# Patient Record
Sex: MALE | Race: WHITE | Employment: FULL TIME | URBAN - METROPOLITAN AREA
[De-identification: names, ages, dates, MRNs, and addresses within clinical notes are randomized per-mention and may not be internally consistent; named-entity substitution may affect disease eponyms.]

---

## 2017-02-07 NOTE — TELEPHONE ENCOUNTER
Approve/deny. Last OV was 7/21/16 and last refill was 11/5/15. Pt has an appt this Thursday with Coral Phillips

## 2017-02-07 NOTE — TELEPHONE ENCOUNTER
Please approve or Deny?     Patient was last seen 07/21/2016  Patient has a follow up with symone on 2/09/2017

## 2017-02-07 NOTE — TELEPHONE ENCOUNTER
Patient called to check on RX. He has been off med for 5 days. Did note that he hasn't been in the office for awhile so scheduled him this week with AV for med follow up Feb. 9th . Can we fill RX to hold him over until appointment.

## 2017-02-07 NOTE — TELEPHONE ENCOUNTER
Patient  Is out of medication lexapro. can have a few pills until his appointment on 2/09/2017 with Dontrell Snow. pharmacy said refill was refused. please call patient at [phone]

## 2017-02-09 ENCOUNTER — MED REC SCAN ONLY (OUTPATIENT)
Dept: FAMILY MEDICINE CLINIC | Facility: CLINIC | Age: 52
End: 2017-02-09

## 2017-02-09 PROBLEM — E55.9 VITAMIN D DEFICIENCY: Status: ACTIVE | Noted: 2017-02-09

## 2017-02-09 NOTE — PROGRESS NOTES
HPI:   Kenneth Whatley is a 49year old male who c/o needing refill on escitalopram 10 mg daily. Has been taking for 2 years. Denies side effects. Tolerating medication well. Not seeing a counselor. Has discussed weaning off lexapro with JULIANN.  States he cesar 1    Pt signed medical release for his lab results from his cardiologist's office. Will order anything additional as needed. We discussed the nature of depression/anxiety. Soheila Poser appears to be safe to His self and others.   I recommended treatment with esc

## 2017-04-24 ENCOUNTER — LAB ENCOUNTER (OUTPATIENT)
Dept: LAB | Age: 52
End: 2017-04-24
Attending: INTERNAL MEDICINE
Payer: COMMERCIAL

## 2017-04-24 ENCOUNTER — OFFICE VISIT (OUTPATIENT)
Dept: INTERNAL MEDICINE CLINIC | Facility: CLINIC | Age: 52
End: 2017-04-24

## 2017-04-24 VITALS
OXYGEN SATURATION: 99 % | WEIGHT: 273.5 LBS | DIASTOLIC BLOOD PRESSURE: 80 MMHG | BODY MASS INDEX: 37.04 KG/M2 | HEIGHT: 72 IN | SYSTOLIC BLOOD PRESSURE: 130 MMHG | TEMPERATURE: 98 F | HEART RATE: 64 BPM | RESPIRATION RATE: 16 BRPM

## 2017-04-24 DIAGNOSIS — K57.30 DIVERTICULOSIS LARGE INTESTINE W/O PERFORATION OR ABSCESS W/O BLEEDING: ICD-10-CM

## 2017-04-24 DIAGNOSIS — Z00.00 ROUTINE GENERAL MEDICAL EXAMINATION AT A HEALTH CARE FACILITY: Primary | ICD-10-CM

## 2017-04-24 DIAGNOSIS — I77.9 LEFT-SIDED CAROTID ARTERY DISEASE (HCC): ICD-10-CM

## 2017-04-24 DIAGNOSIS — Z86.010 HISTORY OF COLON POLYPS: ICD-10-CM

## 2017-04-24 DIAGNOSIS — E66.01 SEVERE OBESITY (BMI 35.0-39.9) WITH COMORBIDITY (HCC): ICD-10-CM

## 2017-04-24 DIAGNOSIS — Z00.00 ROUTINE GENERAL MEDICAL EXAMINATION AT A HEALTH CARE FACILITY: ICD-10-CM

## 2017-04-24 DIAGNOSIS — F41.1 GAD (GENERALIZED ANXIETY DISORDER): ICD-10-CM

## 2017-04-24 PROBLEM — Z86.0100 HISTORY OF COLON POLYPS: Status: ACTIVE | Noted: 2017-04-24

## 2017-04-24 PROBLEM — E55.9 VITAMIN D DEFICIENCY: Status: RESOLVED | Noted: 2017-02-09 | Resolved: 2017-04-24

## 2017-04-24 PROCEDURE — 81003 URINALYSIS AUTO W/O SCOPE: CPT | Performed by: INTERNAL MEDICINE

## 2017-04-24 PROCEDURE — 36415 COLL VENOUS BLD VENIPUNCTURE: CPT | Performed by: INTERNAL MEDICINE

## 2017-04-24 PROCEDURE — 83036 HEMOGLOBIN GLYCOSYLATED A1C: CPT | Performed by: INTERNAL MEDICINE

## 2017-04-24 PROCEDURE — 84153 ASSAY OF PSA TOTAL: CPT | Performed by: INTERNAL MEDICINE

## 2017-04-24 PROCEDURE — 99386 PREV VISIT NEW AGE 40-64: CPT | Performed by: INTERNAL MEDICINE

## 2017-04-24 PROCEDURE — 85025 COMPLETE CBC W/AUTO DIFF WBC: CPT | Performed by: INTERNAL MEDICINE

## 2017-04-24 RX ORDER — ECHINACEA PURPUREA EXTRACT 125 MG
TABLET ORAL
Refills: 11 | COMMUNITY
Start: 2017-04-11 | End: 2018-10-25

## 2017-04-24 NOTE — PROGRESS NOTES
Patient presents with:  Physical: Establish care       HPI: The pt is a 50 y/o WM, new patient, here to establish PCP and to undergo the male CPX. Due for wellness labs, although he had some labs done by his cardiologist (FLP and CMP) in late 11/2016.   Hi Immunization History  Administered            Date(s) Administered    TDAP                  07/01/2014        PE:  /80 mmHg  Pulse 64  Temp(Src) 97.8 °F (36.6 °C) (Oral)  Resp 16  Ht 72\"  Wt 273 lb 8 oz  BMI 37.09 kg/m2  SpO2 99%  Wt Readings Encounter  HGB A1C - CPX  PSA (Screening) - CPX  CBC W/DIFF - CPX  URINALYSIS, ROUTINE - CPX    Meds & Refills for this Visit:  No prescriptions requested or ordered in this encounter       Imaging & Consults:  None      Patient Care Team:  Rosa Zamorano MD

## 2017-09-06 DIAGNOSIS — F41.9 ANXIETY: ICD-10-CM

## 2017-09-06 NOTE — TELEPHONE ENCOUNTER
Requesting refill to be sent to [facility] in [address]. Please call pt to notify him once it has been sent.

## 2017-09-07 DIAGNOSIS — F41.9 ANXIETY: ICD-10-CM

## 2017-09-07 RX ORDER — ESCITALOPRAM OXALATE 10 MG/1
10 TABLET ORAL DAILY
Qty: 90 TABLET | Refills: 1 | Status: SHIPPED | OUTPATIENT
Start: 2017-09-07 | End: 2018-03-05

## 2017-09-07 RX ORDER — ESCITALOPRAM OXALATE 10 MG/1
10 TABLET ORAL DAILY
Qty: 90 TABLET | Refills: 1 | Status: CANCELLED | OUTPATIENT
Start: 2017-09-07

## 2017-11-14 ENCOUNTER — PRIOR ORIGINAL RECORDS (OUTPATIENT)
Dept: OTHER | Age: 52
End: 2017-11-14

## 2017-11-14 ENCOUNTER — MYAURORA ACCOUNT LINK (OUTPATIENT)
Dept: OTHER | Age: 52
End: 2017-11-14

## 2017-11-14 ENCOUNTER — HOSPITAL ENCOUNTER (OUTPATIENT)
Dept: CV DIAGNOSTICS | Age: 52
Discharge: HOME | End: 2017-11-14
Attending: INTERNAL MEDICINE
Payer: COMMERCIAL

## 2017-11-14 DIAGNOSIS — I65.23 BILATERAL CAROTID ARTERY STENOSIS: ICD-10-CM

## 2017-11-20 ENCOUNTER — TELEPHONE (OUTPATIENT)
Dept: INTERNAL MEDICINE CLINIC | Facility: CLINIC | Age: 52
End: 2017-11-20

## 2017-11-20 DIAGNOSIS — E04.1 SOLITARY NODULE OF RIGHT LOBE OF THYROID: Primary | ICD-10-CM

## 2017-11-20 NOTE — TELEPHONE ENCOUNTER
Patient's wife called and has question about process moving forward for Perry Dooley; he has nodule on thyroid he didn't know if he needed an appointment for Dr Chester Kimbrough could advise what step is next

## 2017-11-21 NOTE — TELEPHONE ENCOUNTER
Pt calling for results of carotid doppler which showed a thyroid nodule. Dr Miguel Ángel Kendrick office faxed results over to Dr Francoise Whitaker. Next step? Results on your desk.

## 2017-11-24 NOTE — TELEPHONE ENCOUNTER
Patient needs formal thyroid U/S. I've gone ahead and ordered up testing. He will also need formal thyroid blood test called a TSH, which has been ordered as well. Marcos Spaulding.  Rigoberto Fisher MD  Diplomate, American Board of Internal Medicine  [place] [address]

## 2017-11-28 ENCOUNTER — APPOINTMENT (OUTPATIENT)
Dept: LAB | Age: 52
End: 2017-11-28
Attending: INTERNAL MEDICINE
Payer: COMMERCIAL

## 2017-11-28 ENCOUNTER — HOSPITAL ENCOUNTER (OUTPATIENT)
Dept: ULTRASOUND IMAGING | Age: 52
Discharge: HOME | End: 2017-11-28
Attending: INTERNAL MEDICINE
Payer: COMMERCIAL

## 2017-11-28 DIAGNOSIS — E04.1 SOLITARY NODULE OF RIGHT LOBE OF THYROID: ICD-10-CM

## 2017-11-28 PROCEDURE — 76536 US EXAM OF HEAD AND NECK: CPT | Performed by: INTERNAL MEDICINE

## 2017-11-28 PROCEDURE — 84443 ASSAY THYROID STIM HORMONE: CPT

## 2017-11-28 PROCEDURE — 36415 COLL VENOUS BLD VENIPUNCTURE: CPT

## 2017-12-02 ENCOUNTER — PRIOR ORIGINAL RECORDS (OUTPATIENT)
Dept: OTHER | Age: 52
End: 2017-12-02

## 2017-12-05 ENCOUNTER — PRIOR ORIGINAL RECORDS (OUTPATIENT)
Dept: OTHER | Age: 52
End: 2017-12-05

## 2017-12-05 ENCOUNTER — MYAURORA ACCOUNT LINK (OUTPATIENT)
Dept: OTHER | Age: 52
End: 2017-12-05

## 2017-12-07 ENCOUNTER — MED REC SCAN ONLY (OUTPATIENT)
Dept: FAMILY MEDICINE CLINIC | Facility: CLINIC | Age: 52
End: 2017-12-07

## 2017-12-08 LAB
ALBUMIN: 4.6 G/DL
ALKALINE PHOSPHATATE(ALK PHOS): 55 IU/L
BILIRUBIN TOTAL: 0.7 MG/DL
BUN: 13 MG/DL
CALCIUM: 9.6 MG/DL
CHLORIDE: 105 MEQ/L
CHOLESTEROL, TOTAL: 169 MG/DL
CREATININE, SERUM: 0.91 MG/DL
GLOBULIN: 2.5 G/DL
GLUCOSE: 97 MG/DL
HDL CHOLESTEROL: 56 MG/DL
LDL CHOLESTEROL: 97 MG/DL
POTASSIUM, SERUM: 4.9 MEQ/L
PROTEIN, TOTAL: 7.1 G/DL
SGOT (AST): 17 IU/L
SGPT (ALT): 19 IU/L
SODIUM: 140 MEQ/L
TRIGLYCERIDES: 69 MG/DL

## 2018-01-18 DIAGNOSIS — F41.9 ANXIETY: ICD-10-CM

## 2018-01-18 RX ORDER — ESCITALOPRAM OXALATE 10 MG/1
TABLET ORAL
Qty: 90 TABLET | Refills: 0 | OUTPATIENT
Start: 2018-01-18

## 2018-02-09 ENCOUNTER — APPOINTMENT (OUTPATIENT)
Dept: GENERAL RADIOLOGY | Facility: HOSPITAL | Age: 53
End: 2018-02-09
Attending: EMERGENCY MEDICINE
Payer: COMMERCIAL

## 2018-02-09 ENCOUNTER — HOSPITAL ENCOUNTER (EMERGENCY)
Facility: HOSPITAL | Age: 53
Discharge: HOME | End: 2018-02-09
Attending: EMERGENCY MEDICINE
Payer: COMMERCIAL

## 2018-02-09 VITALS
WEIGHT: 265 LBS | BODY MASS INDEX: 35.89 KG/M2 | SYSTOLIC BLOOD PRESSURE: 129 MMHG | TEMPERATURE: 98 F | HEART RATE: 69 BPM | OXYGEN SATURATION: 97 % | HEIGHT: 72 IN | DIASTOLIC BLOOD PRESSURE: 90 MMHG | RESPIRATION RATE: 18 BRPM

## 2018-02-09 DIAGNOSIS — B34.9 VIRAL SYNDROME: Primary | ICD-10-CM

## 2018-02-09 PROCEDURE — 99282 EMERGENCY DEPT VISIT SF MDM: CPT

## 2018-02-09 NOTE — ED PROVIDER NOTES
Patient Seen in: [facility] Emergency Department    History   Patient presents with:  Cough/URI    Stated Complaint: Cold. HPI    Patient is a pleasant 81-year-old male, presenting for evaluation of congestion, general malaise, and sweats.   [employer] BMI 35.94 kg/m²         Physical Exam    Vital signs noted. GENERAL: Patient is awake and alert, resting comfortably on the cart, in no apparent distress. HEENT: Head is without evidence of trauma. Extraocular muscles are intact.  Pupils are equal and

## 2018-03-05 PROBLEM — E04.2 MULTINODULAR THYROID: Status: ACTIVE | Noted: 2018-03-05

## 2018-03-05 NOTE — PROGRESS NOTES
Patient presents with: Follow - Up: ELIOT - 6 months. HPI: The pt presents today for 6-month f/u ELIOT. Doing well. Stable on prescription medication. No medication SEs. Review of Systems   Constitutional: Negative. Neurological: Negative. of Internal Medicine  [facility]  [address] [facility],[address], [address], [facility], [address]  T: [phone]; F: [phone]     Meds & Refills for this Visit:  Signed Prescriptions Disp Refills    escitalopram 10 MG Oral Tab 90 tablet 1      Sig: Francisco Eller

## 2018-06-11 ENCOUNTER — OFFICE VISIT (OUTPATIENT)
Dept: INTERNAL MEDICINE CLINIC | Facility: CLINIC | Age: 53
End: 2018-06-11

## 2018-06-11 VITALS
OXYGEN SATURATION: 97 % | DIASTOLIC BLOOD PRESSURE: 90 MMHG | RESPIRATION RATE: 18 BRPM | WEIGHT: 280 LBS | BODY MASS INDEX: 38.77 KG/M2 | HEIGHT: 71.25 IN | SYSTOLIC BLOOD PRESSURE: 120 MMHG | TEMPERATURE: 98 F | HEART RATE: 71 BPM

## 2018-06-11 DIAGNOSIS — F41.1 GAD (GENERALIZED ANXIETY DISORDER): ICD-10-CM

## 2018-06-11 DIAGNOSIS — E66.01 SEVERE OBESITY (BMI 35.0-39.9) WITH COMORBIDITY (HCC): ICD-10-CM

## 2018-06-11 DIAGNOSIS — R21 PAPULAR RASH: Primary | ICD-10-CM

## 2018-06-11 DIAGNOSIS — L73.9 FOLLICULITIS: ICD-10-CM

## 2018-06-11 PROCEDURE — 99214 OFFICE O/P EST MOD 30 MIN: CPT | Performed by: INTERNAL MEDICINE

## 2018-06-11 RX ORDER — ESCITALOPRAM OXALATE 10 MG/1
10 TABLET ORAL DAILY
Qty: 90 TABLET | Refills: 1 | Status: SHIPPED | OUTPATIENT
Start: 2018-06-11 | End: 2018-10-25

## 2018-06-11 RX ORDER — ERYTHROMYCIN BASE IN ETHANOL 2 %
1 SWAB, MEDICATED TOPICAL 2 TIMES DAILY
Qty: 60 EACH | Refills: 3 | Status: SHIPPED | OUTPATIENT
Start: 2018-06-11 | End: 2018-06-20

## 2018-06-11 RX ORDER — TRIAMCINOLONE ACETONIDE 0.25 MG/G
1 CREAM TOPICAL 2 TIMES DAILY
Qty: 15 G | Refills: 1 | Status: SHIPPED | OUTPATIENT
Start: 2018-06-11 | End: 2018-10-25

## 2018-06-11 NOTE — PATIENT INSTRUCTIONS
- Use prescription steroid cream (triamcinolone) twice daily for next week  - If no improvement, follow up with Dermatology (Dr. Osmra Haskins).     - Ask your insurance if they cover Shingrix vaccine (for shingles) and if you can receive it in the doctor's offi

## 2018-06-11 NOTE — PROGRESS NOTES
Marek Edmond is a 47year old male. HPI:   Patient presents with:  Rash: on left side of abdomen and right arm  Patient presents with acute dermatological complaint.     He has been dealing with a rash on the left side of his abdomen and right side of mother; High Blood Pressure in his brother; Hypertension in his father; Kidney Disease in his father. Social:  reports that he has never smoked. He has never used smokeless tobacco. He reports that he drinks alcohol. He reports that he does not use drugs. getting Shingrix vaccination.       Patient Care Team:  Carrie Francisco MD as PCP - General (Internal Medicine)  Burt Huertas MD as Consulting Physician (Cardiovascular Diseases)  Adenike Rao MD as Consulting Physician ([facility])  Manish Navarroo

## 2018-06-20 ENCOUNTER — TELEPHONE (OUTPATIENT)
Dept: INTERNAL MEDICINE CLINIC | Facility: CLINIC | Age: 53
End: 2018-06-20

## 2018-06-20 RX ORDER — ERYTHROMYCIN 20 MG/G
1 GEL TOPICAL DAILY
Qty: 60 G | Refills: 1 | Status: SHIPPED | OUTPATIENT
Start: 2018-06-20 | End: 2019-06-03

## 2018-06-20 NOTE — TELEPHONE ENCOUNTER
Received form from pharmacy that Erythromycin Pads are unavailable. Please change to alternative gel/ointment/solution?

## 2018-10-03 RX ORDER — ESCITALOPRAM OXALATE 10 MG/1
10 TABLET ORAL DAILY
Qty: 90 TABLET | Refills: 1 | Status: CANCELLED | OUTPATIENT
Start: 2018-10-03

## 2018-10-03 NOTE — TELEPHONE ENCOUNTER
Escitalopram Oxalate 10 mg 90 day supply   [facility] [address] [address], [phone], [phone]

## 2018-10-04 NOTE — TELEPHONE ENCOUNTER
Refill requested:   Requested Prescriptions     Pending Prescriptions Disp Refills   • escitalopram 10 MG Oral Tab 90 tablet 1     Sig: Take 1 tablet (10 mg total) by mouth daily.        Failed protocol    Last refill: 6/11/18 #[address] [facility] in [place]

## 2018-10-25 ENCOUNTER — OFFICE VISIT (OUTPATIENT)
Dept: INTERNAL MEDICINE CLINIC | Facility: CLINIC | Age: 53
End: 2018-10-25
Payer: COMMERCIAL

## 2018-10-25 VITALS
BODY MASS INDEX: 38.63 KG/M2 | WEIGHT: 279 LBS | DIASTOLIC BLOOD PRESSURE: 78 MMHG | TEMPERATURE: 98 F | SYSTOLIC BLOOD PRESSURE: 134 MMHG | RESPIRATION RATE: 16 BRPM | HEART RATE: 68 BPM | HEIGHT: 71.25 IN

## 2018-10-25 DIAGNOSIS — E04.2 MULTINODULAR THYROID: ICD-10-CM

## 2018-10-25 DIAGNOSIS — Z00.00 ROUTINE GENERAL MEDICAL EXAMINATION AT A HEALTH CARE FACILITY: Primary | ICD-10-CM

## 2018-10-25 DIAGNOSIS — Z23 FLU VACCINE NEED: ICD-10-CM

## 2018-10-25 PROBLEM — E66.812 CLASS 2 OBESITY DUE TO EXCESS CALORIES WITHOUT SERIOUS COMORBIDITY WITH BODY MASS INDEX (BMI) OF 38.0 TO 38.9 IN ADULT: Status: ACTIVE | Noted: 2017-04-24

## 2018-10-25 PROBLEM — E66.09 CLASS 2 OBESITY DUE TO EXCESS CALORIES WITHOUT SERIOUS COMORBIDITY WITH BODY MASS INDEX (BMI) OF 38.0 TO 38.9 IN ADULT: Status: ACTIVE | Noted: 2017-04-24

## 2018-10-25 PROCEDURE — 90471 IMMUNIZATION ADMIN: CPT | Performed by: INTERNAL MEDICINE

## 2018-10-25 PROCEDURE — 90686 IIV4 VACC NO PRSV 0.5 ML IM: CPT | Performed by: INTERNAL MEDICINE

## 2018-10-25 PROCEDURE — 99396 PREV VISIT EST AGE 40-64: CPT | Performed by: INTERNAL MEDICINE

## 2018-10-25 RX ORDER — ESCITALOPRAM OXALATE 10 MG/1
10 TABLET ORAL DAILY
Qty: 90 TABLET | Refills: 3 | Status: SHIPPED | OUTPATIENT
Start: 2018-10-25 | End: 2019-11-04

## 2018-10-25 NOTE — PROGRESS NOTES
Patient presents with:  CPX: pt is not fasting      HPI: Hortensia Qian presents today for male CPX. Doing well. Due for wellness labs. Of note, he's also due for updated thyroid U/S and Flu shot.   Health goals still center around longevity, vitality, and qualit Cancer Mother         breast   • Kidney Disease Father         Dialysis   • Hypertension Father    • High Blood Pressure Brother          Immunization History  Administered            Date(s) Administered    TD                    09/27/2011      TDAP [18098]      Meds & Refills for this Visit:  Requested Prescriptions     Signed Prescriptions Disp Refills   • escitalopram 10 MG Oral Tab 90 tablet 3     Sig: Take 1 tablet (10 mg total) by mouth daily.        Imaging & Consults:  FLULAVAL INFLUENZA VACCIN

## 2018-11-03 ENCOUNTER — LAB ENCOUNTER (OUTPATIENT)
Dept: LAB | Age: 53
End: 2018-11-03
Attending: INTERNAL MEDICINE
Payer: COMMERCIAL

## 2018-11-03 ENCOUNTER — HOSPITAL ENCOUNTER (OUTPATIENT)
Dept: ULTRASOUND IMAGING | Age: 53
Discharge: HOME | End: 2018-11-03
Attending: INTERNAL MEDICINE
Payer: COMMERCIAL

## 2018-11-03 DIAGNOSIS — Z00.00 ROUTINE GENERAL MEDICAL EXAMINATION AT A HEALTH CARE FACILITY: ICD-10-CM

## 2018-11-03 DIAGNOSIS — E04.2 MULTINODULAR THYROID: ICD-10-CM

## 2018-11-03 PROCEDURE — 36415 COLL VENOUS BLD VENIPUNCTURE: CPT

## 2018-11-03 PROCEDURE — 84443 ASSAY THYROID STIM HORMONE: CPT

## 2018-11-03 PROCEDURE — 76536 US EXAM OF HEAD AND NECK: CPT | Performed by: INTERNAL MEDICINE

## 2018-11-03 PROCEDURE — 85025 COMPLETE CBC W/AUTO DIFF WBC: CPT

## 2018-11-03 PROCEDURE — 80061 LIPID PANEL: CPT

## 2018-11-03 PROCEDURE — 83036 HEMOGLOBIN GLYCOSYLATED A1C: CPT

## 2018-11-03 PROCEDURE — 81003 URINALYSIS AUTO W/O SCOPE: CPT

## 2018-11-03 PROCEDURE — 80053 COMPREHEN METABOLIC PANEL: CPT

## 2018-11-05 ENCOUNTER — PATIENT MESSAGE (OUTPATIENT)
Dept: INTERNAL MEDICINE CLINIC | Facility: CLINIC | Age: 53
End: 2018-11-05

## 2018-11-06 RX ORDER — SIMVASTATIN 10 MG
10 TABLET ORAL NIGHTLY
Qty: 90 TABLET | Refills: 3 | Status: SHIPPED | OUTPATIENT
Start: 2018-11-06 | End: 2019-06-10 | Stop reason: ALTCHOICE

## 2018-11-06 NOTE — TELEPHONE ENCOUNTER
From: Angeles Elder  To: Emmy Banks MD  Sent: 11/5/2018 6:45 PM CST  Subject: Prescription Question    [facility] Dr. Alber Quintana , would you be able to refill my simvastatin prescription? I was going to see Dr Ekaterina Valdez every 3 years to observe me. Let me know.  Than

## 2018-11-07 NOTE — PROGRESS NOTES
Script renewed. Courtney Finley. Gwendlyn Jeans, MD  Diplomate, American Board of Internal Medicine  [facility]  [address] [facility],[address], [address], [facility], [address] [place] [address]  T: [phone]; F: [address]

## 2018-11-12 ENCOUNTER — TELEPHONE (OUTPATIENT)
Dept: INTERNAL MEDICINE CLINIC | Facility: CLINIC | Age: 53
End: 2018-11-12

## 2018-11-12 NOTE — TELEPHONE ENCOUNTER
Patient requested thryoid US results be sent to ENT prior to next visit     Nicole Soto - [phone]  P - [phone]    Record release was signed at time of [address]

## 2019-01-03 ENCOUNTER — TELEPHONE (OUTPATIENT)
Dept: INTERNAL MEDICINE CLINIC | Facility: CLINIC | Age: 54
End: 2019-01-03

## 2019-01-03 ENCOUNTER — OFFICE VISIT (OUTPATIENT)
Dept: INTERNAL MEDICINE CLINIC | Facility: CLINIC | Age: 54
End: 2019-01-03
Payer: COMMERCIAL

## 2019-01-03 VITALS
WEIGHT: 280 LBS | HEART RATE: 64 BPM | DIASTOLIC BLOOD PRESSURE: 88 MMHG | HEIGHT: 71.25 IN | TEMPERATURE: 98 F | SYSTOLIC BLOOD PRESSURE: 114 MMHG | BODY MASS INDEX: 38.77 KG/M2

## 2019-01-03 DIAGNOSIS — J06.9 ACUTE URI: Primary | ICD-10-CM

## 2019-01-03 PROCEDURE — 99213 OFFICE O/P EST LOW 20 MIN: CPT | Performed by: INTERNAL MEDICINE

## 2019-01-03 RX ORDER — DEXTROMETHORPHAN HYDROBROMIDE AND PROMETHAZINE HYDROCHLORIDE 15; 6.25 MG/5ML; MG/5ML
5 SYRUP ORAL 4 TIMES DAILY PRN
Qty: 118 ML | Refills: 0 | Status: SHIPPED | OUTPATIENT
Start: 2019-01-03 | End: 2019-06-03

## 2019-01-03 RX ORDER — ERYTHROMYCIN BASE IN ETHANOL 2 %
SWAB, MEDICATED TOPICAL
Qty: 60 EACH | Refills: 0 | Status: SHIPPED | OUTPATIENT
Start: 2019-01-03 | End: 2019-06-03

## 2019-01-03 RX ORDER — AZITHROMYCIN 250 MG/1
TABLET, FILM COATED ORAL
Qty: 6 TABLET | Refills: 0 | Status: SHIPPED | OUTPATIENT
Start: 2019-01-03 | End: 2019-06-03

## 2019-01-03 NOTE — TELEPHONE ENCOUNTER
Patient has had URI and is prone to pneumonia would like to be seen for ongoing issues; requested nurse callback to triage further.  Offered walk in to wife (she called initially) or  kira

## 2019-01-03 NOTE — TELEPHONE ENCOUNTER
Pt stated to have symptoms for x2 weeks. Has tried otc cold medication w/o relief. Pt stated stronger cough at night time. Pt can come after 3pm [place].

## 2019-01-03 NOTE — TELEPHONE ENCOUNTER
OK to put him in with me at 4:45 PM today. Lydia Cox. Abel Patel MD  Diplomate, American Board of Internal Medicine  [facility]  [address] [place] [address],[address], [address], [facility], [address] [place] [address]  T: [phone]; F: [address]

## 2019-01-03 NOTE — PROGRESS NOTES
Patient presents with:  Cough: dry cough x 2 weeks  Headache  Nasal Congestion: runny nose, stuffy nose      HPI: Jose Shaw presents today for eval of 2 weeks of cough, nasal congestion, and headache. Has been feeling under the weather.   Possible sick contact of my ability. Crissy Rodriguez. Emelyn Hallman MD  Diplomate, American Board of Internal Medicine  [facility]  [address] [facility],[address], [address], [facility], [address]  T: [id number]; F: [address]     Meds & Refills for this Visit:  Requested Prescriptions

## 2019-01-07 ENCOUNTER — OFFICE VISIT (OUTPATIENT)
Dept: SURGERY | Facility: CLINIC | Age: 54
End: 2019-01-07
Payer: COMMERCIAL

## 2019-01-07 VITALS
WEIGHT: 280 LBS | BODY MASS INDEX: 38.77 KG/M2 | DIASTOLIC BLOOD PRESSURE: 80 MMHG | HEIGHT: 71.25 IN | SYSTOLIC BLOOD PRESSURE: 134 MMHG | TEMPERATURE: 98 F | HEART RATE: 70 BPM

## 2019-01-07 DIAGNOSIS — E66.09 CLASS 2 OBESITY DUE TO EXCESS CALORIES WITHOUT SERIOUS COMORBIDITY WITH BODY MASS INDEX (BMI) OF 38.0 TO 38.9 IN ADULT: ICD-10-CM

## 2019-01-07 DIAGNOSIS — R15.1 FECAL SMEARING: Primary | ICD-10-CM

## 2019-01-07 DIAGNOSIS — Z86.010 HISTORY OF COLON POLYPS: ICD-10-CM

## 2019-01-07 DIAGNOSIS — K57.30 DIVERTICULOSIS OF LARGE INTESTINE WITHOUT HEMORRHAGE: ICD-10-CM

## 2019-01-07 PROCEDURE — 99203 OFFICE O/P NEW LOW 30 MIN: CPT | Performed by: COLON & RECTAL SURGERY

## 2019-01-07 PROCEDURE — 46600 DIAGNOSTIC ANOSCOPY SPX: CPT | Performed by: COLON & RECTAL SURGERY

## 2019-01-07 NOTE — H&P
New Patient Visit Note       Active Problems      1. Fecal smearing    2. Diverticulosis of large intestine without hemorrhage    3. History of colon polyps    4.  Class 2 obesity due to excess calories without serious comorbidity with body mass index (BMI) [place], [facility]   • COLONOSCOPY,DIAGNOSTIC  7/23/16    cecal, 3 hepatic flexure, 2 descending polyps, diverticulosis   • VASECTOMY  2/26/10    Dr. Renny Palafox       The family history and social history have been reviewed by me today.     Family History   Problem R cough, shortness of breath and wheezing. Cardiovascular: Negative for chest pain, palpitations and leg swelling.    Gastrointestinal: Negative for abdominal distention, abdominal pain, anal bleeding, blood in stool, constipation, diarrhea, nausea and vom lesions. Digital rectal exam was performed. Patient had good resting anal sphincter tone. Patient had a long anal canal with a deep anus in relation to the buttocks. Anoscopy was performed.   There was mild enlargement of the internal hemorrhoids in

## 2019-02-17 NOTE — PATIENT INSTRUCTIONS
Assessment   Fecal smearing  (primary encounter diagnosis)  Diverticulosis of large intestine without hemorrhage  History of colon polyps  Class 2 obesity due to excess calories without serious comorbidity with body mass index (BMI) of 38.0 to 38.9 in adul

## 2019-02-28 VITALS
DIASTOLIC BLOOD PRESSURE: 90 MMHG | WEIGHT: 279 LBS | BODY MASS INDEX: 35.81 KG/M2 | HEIGHT: 74 IN | SYSTOLIC BLOOD PRESSURE: 138 MMHG | HEART RATE: 58 BPM

## 2019-06-07 ENCOUNTER — HOSPITAL ENCOUNTER (OUTPATIENT)
Dept: ULTRASOUND IMAGING | Facility: HOSPITAL | Age: 54
Discharge: HOME | End: 2019-06-07
Attending: INTERNAL MEDICINE
Payer: COMMERCIAL

## 2019-06-07 DIAGNOSIS — I77.9 CAROTID ARTERY DISEASE (HCC): ICD-10-CM

## 2019-06-07 PROCEDURE — 93880 EXTRACRANIAL BILAT STUDY: CPT | Performed by: INTERNAL MEDICINE

## 2019-06-10 NOTE — PROGRESS NOTES
Please inform patient. Carotid stenosis - less than 50% bilaterally - noted. Dr. Martin Hardy recommends: discontinue Simvastatin. Start Atorvastatin 10 mg daily. Start ASA 81 mg daily. Re check lipids / LFTs in 6 weeks. Return to see Dr. Martin Hardy in 3 months.  Nu Larson

## 2019-11-04 ENCOUNTER — LAB ENCOUNTER (OUTPATIENT)
Dept: LAB | Age: 54
End: 2019-11-04
Attending: INTERNAL MEDICINE
Payer: COMMERCIAL

## 2019-11-04 ENCOUNTER — OFFICE VISIT (OUTPATIENT)
Dept: INTERNAL MEDICINE CLINIC | Facility: CLINIC | Age: 54
End: 2019-11-04
Payer: COMMERCIAL

## 2019-11-04 VITALS
HEIGHT: 71.25 IN | WEIGHT: 288.5 LBS | HEART RATE: 64 BPM | TEMPERATURE: 98 F | DIASTOLIC BLOOD PRESSURE: 76 MMHG | SYSTOLIC BLOOD PRESSURE: 130 MMHG | BODY MASS INDEX: 39.94 KG/M2

## 2019-11-04 DIAGNOSIS — Z86.39 HISTORY OF VITAMIN D DEFICIENCY: ICD-10-CM

## 2019-11-04 DIAGNOSIS — E04.2 MULTINODULAR THYROID: ICD-10-CM

## 2019-11-04 DIAGNOSIS — Z12.5 SCREENING PSA (PROSTATE SPECIFIC ANTIGEN): ICD-10-CM

## 2019-11-04 DIAGNOSIS — E66.01 CLASS 2 SEVERE OBESITY DUE TO EXCESS CALORIES WITH SERIOUS COMORBIDITY AND BODY MASS INDEX (BMI) OF 39.0 TO 39.9 IN ADULT (HCC): Primary | ICD-10-CM

## 2019-11-04 DIAGNOSIS — Z00.00 ROUTINE GENERAL MEDICAL EXAMINATION AT A HEALTH CARE FACILITY: ICD-10-CM

## 2019-11-04 DIAGNOSIS — Z23 FLU VACCINE NEED: ICD-10-CM

## 2019-11-04 PROCEDURE — 85025 COMPLETE CBC W/AUTO DIFF WBC: CPT | Performed by: INTERNAL MEDICINE

## 2019-11-04 PROCEDURE — 99396 PREV VISIT EST AGE 40-64: CPT | Performed by: INTERNAL MEDICINE

## 2019-11-04 PROCEDURE — 90471 IMMUNIZATION ADMIN: CPT | Performed by: INTERNAL MEDICINE

## 2019-11-04 PROCEDURE — 83036 HEMOGLOBIN GLYCOSYLATED A1C: CPT | Performed by: INTERNAL MEDICINE

## 2019-11-04 PROCEDURE — 90686 IIV4 VACC NO PRSV 0.5 ML IM: CPT | Performed by: INTERNAL MEDICINE

## 2019-11-04 PROCEDURE — 84443 ASSAY THYROID STIM HORMONE: CPT | Performed by: INTERNAL MEDICINE

## 2019-11-04 PROCEDURE — 84153 ASSAY OF PSA TOTAL: CPT | Performed by: INTERNAL MEDICINE

## 2019-11-04 PROCEDURE — 36415 COLL VENOUS BLD VENIPUNCTURE: CPT | Performed by: INTERNAL MEDICINE

## 2019-11-04 PROCEDURE — 82306 VITAMIN D 25 HYDROXY: CPT | Performed by: INTERNAL MEDICINE

## 2019-11-04 RX ORDER — ESCITALOPRAM OXALATE 10 MG/1
10 TABLET ORAL DAILY
Qty: 90 TABLET | Refills: 3 | Status: SHIPPED | OUTPATIENT
Start: 2019-11-04 | End: 2020-10-19

## 2019-11-04 RX ORDER — ASPIRIN 81 MG/1
TABLET, COATED ORAL
Refills: 11 | COMMUNITY
Start: 2019-10-08 | End: 2019-11-04

## 2019-11-04 NOTE — PROGRESS NOTES
Patient presents with:  CPX      HPI: Adrien Cota presents today for male CPX. Stable health. Due for select wellness labs. Health goals center around longevity, vitality, and QOL. Review of Systems   All other systems reviewed and are negative.     Patient 07/01/2014 03/11/2019        PE:  /76 (BP Location: Left arm, Patient Position: Sitting, Cuff Size: adult)   Pulse 64   Temp 98.1 °F (36.7 °C) (Oral)   Ht 71.25\"   Wt 288 lb 8 oz (130.9 kg)   BMI 39.96 kg/m²   Wt Readings from Last 6 Encounters:  1 Check wellness labs. 2. HM/Prev - Check PSA. Flu shot given. 3. Multinodular thyroid - Obtain updated U/S. If still stable then I would consider stopping doing these annual checks as he would've had 3 years in a row of stability.   4. Hx of Vitamin D de

## 2019-11-07 RX ORDER — ESCITALOPRAM OXALATE 10 MG/1
TABLET ORAL
Qty: 90 TABLET | Refills: 0 | OUTPATIENT
Start: 2019-11-07

## 2019-11-14 ENCOUNTER — HOSPITAL ENCOUNTER (OUTPATIENT)
Dept: ULTRASOUND IMAGING | Age: 54
Discharge: HOME | End: 2019-11-14
Attending: INTERNAL MEDICINE
Payer: COMMERCIAL

## 2019-11-14 DIAGNOSIS — E04.2 MULTINODULAR THYROID: ICD-10-CM

## 2019-11-14 PROCEDURE — 76536 US EXAM OF HEAD AND NECK: CPT | Performed by: INTERNAL MEDICINE

## 2019-12-05 ENCOUNTER — PATIENT MESSAGE (OUTPATIENT)
Dept: INTERNAL MEDICINE CLINIC | Facility: CLINIC | Age: 54
End: 2019-12-05

## 2019-12-06 ENCOUNTER — APPOINTMENT (OUTPATIENT)
Dept: LAB | Age: 54
End: 2019-12-06
Attending: NURSE PRACTITIONER
Payer: COMMERCIAL

## 2019-12-06 ENCOUNTER — OFFICE VISIT (OUTPATIENT)
Dept: INTERNAL MEDICINE CLINIC | Facility: CLINIC | Age: 54
End: 2019-12-06
Payer: COMMERCIAL

## 2019-12-06 VITALS
SYSTOLIC BLOOD PRESSURE: 120 MMHG | WEIGHT: 293 LBS | BODY MASS INDEX: 40.57 KG/M2 | RESPIRATION RATE: 16 BRPM | DIASTOLIC BLOOD PRESSURE: 80 MMHG | TEMPERATURE: 98 F | HEIGHT: 71.25 IN | HEART RATE: 72 BPM

## 2019-12-06 DIAGNOSIS — R25.2 MUSCLE CRAMPING: Primary | ICD-10-CM

## 2019-12-06 DIAGNOSIS — R25.2 MUSCLE CRAMPING: ICD-10-CM

## 2019-12-06 DIAGNOSIS — M54.31 SCIATICA OF RIGHT SIDE: ICD-10-CM

## 2019-12-06 PROCEDURE — 99213 OFFICE O/P EST LOW 20 MIN: CPT | Performed by: NURSE PRACTITIONER

## 2019-12-06 PROCEDURE — 83735 ASSAY OF MAGNESIUM: CPT | Performed by: NURSE PRACTITIONER

## 2019-12-06 PROCEDURE — 83540 ASSAY OF IRON: CPT | Performed by: NURSE PRACTITIONER

## 2019-12-06 PROCEDURE — 36415 COLL VENOUS BLD VENIPUNCTURE: CPT | Performed by: NURSE PRACTITIONER

## 2019-12-06 RX ORDER — CYCLOBENZAPRINE HCL 10 MG
10 TABLET ORAL NIGHTLY
Qty: 10 TABLET | Refills: 0 | Status: SHIPPED | OUTPATIENT
Start: 2019-12-06 | End: 2019-12-16

## 2019-12-06 NOTE — PATIENT INSTRUCTIONS
Hamstring Curls    The following exercise helps build strong, balanced leg muscles. Make sure to adjust exercise machines as instructed by your physical therapist. He or she will tell you how many times to do the exercise.   Note: To prevent injury, leslie © 5179-1791 [employer] [address]. [address] [facility], [facility] [place]. All rights reserved. This information is not intended as a substitute for professional medical care. Always follow your healthcare professional's instructions.         Marium

## 2019-12-06 NOTE — PROGRESS NOTES
CHIEF COMPLAINT:     Patient presents with:  Leg Pain: Pt c/o right leg pain. States it is a throbbing pain. 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  Has taken Aleve - 3 tablets q 8 hours x 5 days with no c apparent distress  SKIN: no rashes, no suspicious lesions  LUNGS: clear to auscultation bilaterally, no wheezes or rhonchi. Breathing is non labored. CARDIO: RRR without murmur  EXTREMITIES: no cyanosis, clubbing or edema.  FROM of right hip and knee witho

## 2019-12-09 ENCOUNTER — PATIENT MESSAGE (OUTPATIENT)
Dept: INTERNAL MEDICINE CLINIC | Facility: CLINIC | Age: 54
End: 2019-12-09

## 2019-12-09 NOTE — TELEPHONE ENCOUNTER
Pt asking if Dr Carloz Burgess will order MRI right leg. Pt seen on 12/06 and despite cyclobenzaprine leg pain worse after doing exercises this weekend. Rates pain at 8 with weight bearing. Standing pt states he is ok.   Pain starts at right thigh and travels to s

## 2019-12-09 NOTE — TELEPHONE ENCOUNTER
From: Bettyjo Lesches  To: Purnima Le MD  Sent: 12/9/2019 7:54 AM CST  Subject: Non-Urgent Medical Question    Good Morning ,    I was seen Friday for my leg. She recommended exercises and muscle relaxer at night.  She said if it doesn’t improve she would

## 2019-12-09 NOTE — TELEPHONE ENCOUNTER
Patient calling for nurse callback to discuss MRI for leg pain; KR not in office today, please call to triage further per patient request

## 2019-12-10 ENCOUNTER — TELEPHONE (OUTPATIENT)
Dept: ORTHOPEDICS CLINIC | Facility: CLINIC | Age: 54
End: 2019-12-10

## 2019-12-10 DIAGNOSIS — M25.561 RIGHT KNEE PAIN, UNSPECIFIED CHRONICITY: Primary | ICD-10-CM

## 2019-12-10 DIAGNOSIS — M25.551 RIGHT HIP PAIN: Primary | ICD-10-CM

## 2019-12-10 DIAGNOSIS — M79.661 PAIN OF RIGHT LOWER LEG: ICD-10-CM

## 2019-12-10 NOTE — TELEPHONE ENCOUNTER
LMOM and notified patient via mychart that orders were placed in system prior to appointment     Future Appointments   Date Time Provider Willis Guillen   12/13/2019  8:00 AM Art Freeman MD [facility] [place]   12/19/2019  3:00 PM Gely Lofton MD E

## 2019-12-10 NOTE — TELEPHONE ENCOUNTER
Patient is being referred for right thigh/hip pain, having issues with weight bearing.  Please add orders to system so patient can have them done prior to appointment     Future Appointments   Date Time Provider Willis Guillen   12/13/2019  8:00 AM Jaen Boudreaux

## 2019-12-10 NOTE — TELEPHONE ENCOUNTER
Please replace order. Currently for hip and pelvis. Patient phoned saying that his issue is the right hamstring, knee and shin (calf). He has cancelled his appointment with xray and is awaiting confirmation of order for his complaint.     Please advise

## 2019-12-11 ENCOUNTER — HOSPITAL ENCOUNTER (OUTPATIENT)
Dept: GENERAL RADIOLOGY | Age: 54
Discharge: HOME | End: 2019-12-11
Attending: ORTHOPAEDIC SURGERY
Payer: COMMERCIAL

## 2019-12-11 DIAGNOSIS — M79.661 PAIN OF RIGHT LOWER LEG: ICD-10-CM

## 2019-12-11 DIAGNOSIS — M25.551 RIGHT HIP PAIN: ICD-10-CM

## 2019-12-11 DIAGNOSIS — M25.561 RIGHT KNEE PAIN, UNSPECIFIED CHRONICITY: ICD-10-CM

## 2019-12-11 PROCEDURE — 73590 X-RAY EXAM OF LOWER LEG: CPT | Performed by: ORTHOPAEDIC SURGERY

## 2019-12-11 PROCEDURE — 73502 X-RAY EXAM HIP UNI 2-3 VIEWS: CPT | Performed by: ORTHOPAEDIC SURGERY

## 2019-12-11 PROCEDURE — 73564 X-RAY EXAM KNEE 4 OR MORE: CPT | Performed by: ORTHOPAEDIC SURGERY

## 2019-12-13 ENCOUNTER — OFFICE VISIT (OUTPATIENT)
Dept: ORTHOPEDICS CLINIC | Facility: CLINIC | Age: 54
End: 2019-12-13
Payer: COMMERCIAL

## 2019-12-13 ENCOUNTER — TELEPHONE (OUTPATIENT)
Dept: ORTHOPEDICS CLINIC | Facility: CLINIC | Age: 54
End: 2019-12-13

## 2019-12-13 VITALS
RESPIRATION RATE: 16 BRPM | OXYGEN SATURATION: 97 % | WEIGHT: 293 LBS | HEART RATE: 70 BPM | BODY MASS INDEX: 40.57 KG/M2 | HEIGHT: 71.25 IN

## 2019-12-13 DIAGNOSIS — M79.604 PAIN OF RIGHT LOWER EXTREMITY: Primary | ICD-10-CM

## 2019-12-13 PROCEDURE — 99203 OFFICE O/P NEW LOW 30 MIN: CPT | Performed by: ORTHOPAEDIC SURGERY

## 2019-12-13 RX ORDER — CYCLOBENZAPRINE HCL 10 MG
10 TABLET ORAL NIGHTLY
Qty: 10 TABLET | Refills: 0 | OUTPATIENT
Start: 2019-12-13 | End: 2019-12-23

## 2019-12-13 NOTE — TELEPHONE ENCOUNTER
Disregard request    Patient picked up 10 day supply on 12/6/2019    Was instructed to only take for 10 days

## 2019-12-13 NOTE — H&P
[facility] New Patient Note    CC: Patient presents with:  Consult: right leg pain started 3 weeks ago leg spasms and achy feeling. Patient was given muscle relaxer and home exercises from PCP.  Monday he states he could not walk and his knee was swol Refill   • Cyclobenzaprine HCl 10 MG Oral Tab Take 1 tablet (10 mg total) by mouth nightly for 10 days. 10 tablet 0   • escitalopram 10 MG Oral Tab Take 1 tablet (10 mg total) by mouth daily.  90 tablet 3   • atorvastatin 20 MG Oral Tab Take 1 tablet (20 mg sagittal plane  • Neuromuscular: 5 out of 5 quadriceps strength, 5 out of 5 EHL/tibialis anterior and gastrocsoleus.   Sensation intact light touch in SP, DP and tibial nerve distributions  • Vascular: Warm and well-perfused lower extremity      Imaging: X-

## 2019-12-13 NOTE — TELEPHONE ENCOUNTER
S/w patient regarding medication refills. Per Dr. Dominga Miranda patient should call PCP for refill on Flexeril and informed patient that Aleve is the same as Naproxen that a script is not needed. Patient verbalized understanding.  MJ

## 2019-12-13 NOTE — TELEPHONE ENCOUNTER
Patient is requesting a refill on his flexeril that his PCP gave him and he would like a script for naproxen

## 2019-12-17 ENCOUNTER — HOSPITAL ENCOUNTER (OUTPATIENT)
Dept: MRI IMAGING | Facility: HOSPITAL | Age: 54
Discharge: HOME | End: 2019-12-17
Attending: ORTHOPAEDIC SURGERY
Payer: COMMERCIAL

## 2019-12-17 DIAGNOSIS — M79.604 PAIN OF RIGHT LOWER EXTREMITY: ICD-10-CM

## 2019-12-17 PROCEDURE — A9575 INJ GADOTERATE MEGLUMI 0.1ML: HCPCS | Performed by: ORTHOPAEDIC SURGERY

## 2019-12-17 PROCEDURE — 73720 MRI LWR EXTREMITY W/O&W/DYE: CPT | Performed by: ORTHOPAEDIC SURGERY

## 2019-12-19 ENCOUNTER — TELEPHONE (OUTPATIENT)
Dept: ORTHOPEDICS CLINIC | Facility: CLINIC | Age: 54
End: 2019-12-19

## 2019-12-19 ENCOUNTER — OFFICE VISIT (OUTPATIENT)
Dept: ELECTROPHYSIOLOGY | Facility: HOSPITAL | Age: 54
End: 2019-12-19
Attending: INTERNAL MEDICINE
Payer: COMMERCIAL

## 2019-12-19 DIAGNOSIS — R20.2 PARESTHESIA OF BOTH HANDS: ICD-10-CM

## 2019-12-19 DIAGNOSIS — R20.0 NUMBNESS IN BOTH HANDS: ICD-10-CM

## 2019-12-19 DIAGNOSIS — R20.2 TINGLING IN EXTREMITIES: Primary | ICD-10-CM

## 2019-12-19 DIAGNOSIS — M79.642 BILATERAL HAND PAIN: ICD-10-CM

## 2019-12-19 DIAGNOSIS — M79.641 BILATERAL HAND PAIN: ICD-10-CM

## 2019-12-19 NOTE — TELEPHONE ENCOUNTER
Patient is calling for MRI results. Can I relay the results to the patient? PROCEDURE:  MRI LOWER LEG (W+WO), RIGHT (CPT=73720)   INDICATIONS:  M79.604 Pain in right leg. Right proximal lower leg pain.     TECHNIQUE:    A variety of imaging planes and

## 2019-12-20 ENCOUNTER — TELEPHONE (OUTPATIENT)
Dept: ORTHOPEDICS CLINIC | Facility: CLINIC | Age: 54
End: 2019-12-20

## 2019-12-20 DIAGNOSIS — S83.91XA SPRAIN OF RIGHT KNEE, UNSPECIFIED LIGAMENT, INITIAL ENCOUNTER: Primary | ICD-10-CM

## 2019-12-20 PROBLEM — R20.2 TINGLING IN EXTREMITIES: Status: ACTIVE | Noted: 2019-12-20

## 2019-12-20 NOTE — PROCEDURES
[facility], [address] [place] [address]      PATIENT'S NAME: Cody Smith   REFERRING PHYSICIAN: Maco Sanabria M.D.    PATIENT ACCOUNT #: [de-identified] LOCATION: [facility]   MEDICAL RECORD #: [id number] DATE OF BIRTH: [birth year]

## 2019-12-20 NOTE — TELEPHONE ENCOUNTER
S/w patient regarding future care. Per Dr. Dominga Miranda patient is to continue taking Anti-inflammatory. Patient will start PT would like external script placed for [address] sent on 12.20. 19.  MJ

## 2019-12-20 NOTE — TELEPHONE ENCOUNTER
S/w patient regarding MRI results and conveyed results were normal. Patient continued to ask why the continued pain in the lower leg. Patient states he is still unable to bear weight.  I informed patient I will take his concerns and speak with Dr. Radha Villanueva

## 2019-12-26 PROBLEM — M17.11 PRIMARY OSTEOARTHRITIS OF RIGHT KNEE: Status: ACTIVE | Noted: 2019-12-26

## 2020-02-07 ENCOUNTER — PATIENT MESSAGE (OUTPATIENT)
Dept: INTERNAL MEDICINE CLINIC | Facility: CLINIC | Age: 55
End: 2020-02-07

## 2020-02-07 ENCOUNTER — TELEPHONE (OUTPATIENT)
Dept: INTERNAL MEDICINE CLINIC | Facility: CLINIC | Age: 55
End: 2020-02-07

## 2020-02-07 NOTE — TELEPHONE ENCOUNTER
From: Sun Guillaume  To:  Toña Khan MD  Sent: 2/7/2020 11:33 AM CST  Subject: Other    Hello,    I noticed on my November 4th labs my insurance didn’t cover two test.   I know we check my CBC for my levels as I have high cholesterol that’s why I’m on me

## 2020-02-12 NOTE — TELEPHONE ENCOUNTER
addended the DOS 11/04/2019 and associated an E66.09 dx code to the CBC and Hgb A1C orders.- Request sent to billing to resubmit with corrected diagnosis

## 2020-02-21 NOTE — PROGRESS NOTES
ADDENDUM:    In the assessment/plan section, I ordered the tests TSH w/ reflex free T4 and A1c. The reason for the TSH testing is for his diagnosis of multinodular thyroid. His reason for the A1c is for the diagnosis of Class 2 obesity. Casper Muñoz.  Tyrone Rivera,

## 2020-07-02 ENCOUNTER — PATIENT MESSAGE (OUTPATIENT)
Dept: INTERNAL MEDICINE CLINIC | Facility: CLINIC | Age: 55
End: 2020-07-02

## 2020-07-02 RX ORDER — ERYTHROMYCIN BASE IN ETHANOL 2 %
SWAB, MEDICATED TOPICAL
Qty: 60 EACH | Refills: 0 | Status: SHIPPED | OUTPATIENT
Start: 2020-07-02 | End: 2021-03-23

## 2020-07-02 NOTE — TELEPHONE ENCOUNTER
From: Anderson Hernandez  To: Sindi Antonio MD  Sent: 7/2/2020 8:39 AM CDT  Subject: Prescription Question    Hello,    I didn’t see it on my list but you prescribed Aminata 2% pads for me before to help. With the hot weather and my job can I get a refill?      I’m

## 2020-08-14 ENCOUNTER — TELEPHONE (OUTPATIENT)
Dept: INTERNAL MEDICINE CLINIC | Facility: CLINIC | Age: 55
End: 2020-08-14

## 2020-08-14 NOTE — TELEPHONE ENCOUNTER
Pt would like some advise from nurse says that at work he was around a coworker who tested positive for COVID

## 2020-08-14 NOTE — TELEPHONE ENCOUNTER
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 Pt last contact w/ person was on Wednesday. Pt advised he was within 2 feet of person but they were both wearing mask/face covering at all times.  Pt denies any symp

## 2020-08-31 ENCOUNTER — APPOINTMENT (OUTPATIENT)
Dept: GENERAL RADIOLOGY | Age: 55
End: 2020-08-31
Payer: COMMERCIAL

## 2020-08-31 ENCOUNTER — HOSPITAL ENCOUNTER (EMERGENCY)
Age: 55
Discharge: HOME | End: 2020-08-31
Attending: EMERGENCY MEDICINE
Payer: COMMERCIAL

## 2020-08-31 VITALS
TEMPERATURE: 98 F | BODY MASS INDEX: 37.25 KG/M2 | WEIGHT: 275 LBS | SYSTOLIC BLOOD PRESSURE: 129 MMHG | RESPIRATION RATE: 18 BRPM | DIASTOLIC BLOOD PRESSURE: 74 MMHG | HEIGHT: 72 IN | HEART RATE: 64 BPM | OXYGEN SATURATION: 98 %

## 2020-08-31 DIAGNOSIS — S93.402A MODERATE LEFT ANKLE SPRAIN, INITIAL ENCOUNTER: Primary | ICD-10-CM

## 2020-08-31 PROCEDURE — 99283 EMERGENCY DEPT VISIT LOW MDM: CPT

## 2020-08-31 PROCEDURE — 73610 X-RAY EXAM OF ANKLE: CPT | Performed by: EMERGENCY MEDICINE

## 2020-08-31 PROCEDURE — 73630 X-RAY EXAM OF FOOT: CPT | Performed by: EMERGENCY MEDICINE

## 2020-08-31 NOTE — ED PROVIDER NOTES
Patient Seen in: [address] In [place]      History   Patient presents with:  Lower Extremity Injury    Stated Complaint: ANKLE INJ    HPI    56-year-old male complaint of left ankle pain and foot pain the patient rolled this yesterda Course   Labs Reviewed - No data to display       Xr Ankle (min 3 Views), Left (cpt=73610)    Result Date: 8/31/2020  CONCLUSION:    No acute fracture, or dislocation. Mild ankle DJD. Mild anterior tissue swelling may reflect contusion.   Plantar calcanea

## 2020-10-14 ENCOUNTER — HOSPITAL ENCOUNTER (OUTPATIENT)
Dept: CT IMAGING | Age: 55
Discharge: HOME | End: 2020-10-14
Attending: INTERNAL MEDICINE

## 2020-10-14 DIAGNOSIS — Z13.9 ENCOUNTER FOR SCREENING: ICD-10-CM

## 2020-10-19 ENCOUNTER — OFFICE VISIT (OUTPATIENT)
Dept: INTERNAL MEDICINE CLINIC | Facility: CLINIC | Age: 55
End: 2020-10-19
Payer: COMMERCIAL

## 2020-10-19 VITALS
HEART RATE: 64 BPM | TEMPERATURE: 98 F | BODY MASS INDEX: 39.87 KG/M2 | WEIGHT: 288 LBS | RESPIRATION RATE: 16 BRPM | HEIGHT: 71.25 IN | DIASTOLIC BLOOD PRESSURE: 92 MMHG | SYSTOLIC BLOOD PRESSURE: 128 MMHG

## 2020-10-19 DIAGNOSIS — Z23 FLU VACCINE NEED: ICD-10-CM

## 2020-10-19 DIAGNOSIS — E04.2 MULTINODULAR THYROID: ICD-10-CM

## 2020-10-19 DIAGNOSIS — Z12.5 SCREENING PSA (PROSTATE SPECIFIC ANTIGEN): ICD-10-CM

## 2020-10-19 DIAGNOSIS — E66.09 CLASS 2 OBESITY DUE TO EXCESS CALORIES WITHOUT SERIOUS COMORBIDITY WITH BODY MASS INDEX (BMI) OF 39.0 TO 39.9 IN ADULT: ICD-10-CM

## 2020-10-19 DIAGNOSIS — Z00.00 ROUTINE GENERAL MEDICAL EXAMINATION AT A HEALTH CARE FACILITY: Primary | ICD-10-CM

## 2020-10-19 PROCEDURE — 90471 IMMUNIZATION ADMIN: CPT | Performed by: INTERNAL MEDICINE

## 2020-10-19 PROCEDURE — 3080F DIAST BP >= 90 MM HG: CPT | Performed by: INTERNAL MEDICINE

## 2020-10-19 PROCEDURE — 3074F SYST BP LT 130 MM HG: CPT | Performed by: INTERNAL MEDICINE

## 2020-10-19 PROCEDURE — 90686 IIV4 VACC NO PRSV 0.5 ML IM: CPT | Performed by: INTERNAL MEDICINE

## 2020-10-19 PROCEDURE — 3008F BODY MASS INDEX DOCD: CPT | Performed by: INTERNAL MEDICINE

## 2020-10-19 PROCEDURE — 99396 PREV VISIT EST AGE 40-64: CPT | Performed by: INTERNAL MEDICINE

## 2020-10-19 RX ORDER — ESCITALOPRAM OXALATE 10 MG/1
10 TABLET ORAL DAILY
Qty: 90 TABLET | Refills: 3 | Status: SHIPPED | OUTPATIENT
Start: 2020-10-19 | End: 2020-11-23

## 2020-10-19 NOTE — PROGRESS NOTES
Patient presents with:  CPX      HPI: Iftikhar Escamilla presents for annual CPX. Stable health. Due for wellness labs. Health goals center around longevity, vitality, and QOL. Review of Systems   All other systems reviewed and are negative.       Patient Active Pro ml Prefilled syringe (40946)                          10/25/2018  11/04/2019      TD                    09/27/2011      TDAP                  07/01/2014 03/11/2019        PE:  BP (!) 128/92 (BP Location: Left arm, Patient Position: Sitting, Cuff Size: thais remains the same regardless of choice. He verbally agrees to and understands the plan as outlined above. He was also afforded the time and opportunity to ask questions, which were then answered to the best of my ability. Marcos Spaulding.  Rigoberto Fisher MD  Diplomate, A

## 2020-10-19 NOTE — PATIENT INSTRUCTIONS
Dear Shantal Holland,    Thank you for expressing interest in learning more about my new medical practice.  Your choice to be a member of my [facility]-affiliated practice, in partnership with Linda Arguelles, is an investment in your health and well-being that can He's in [place].

## 2020-10-22 ENCOUNTER — LABORATORY ENCOUNTER (OUTPATIENT)
Dept: LAB | Age: 55
End: 2020-10-22
Attending: INTERNAL MEDICINE
Payer: COMMERCIAL

## 2020-10-22 DIAGNOSIS — I77.9 BILATERAL CAROTID ARTERY DISEASE, UNSPECIFIED TYPE (HCC): ICD-10-CM

## 2020-10-22 DIAGNOSIS — E04.2 MULTINODULAR THYROID: ICD-10-CM

## 2020-10-22 DIAGNOSIS — E78.00 PURE HYPERCHOLESTEROLEMIA: ICD-10-CM

## 2020-10-22 DIAGNOSIS — R03.0 ELEVATED BLOOD PRESSURE READING: ICD-10-CM

## 2020-10-22 DIAGNOSIS — Z12.5 SCREENING PSA (PROSTATE SPECIFIC ANTIGEN): ICD-10-CM

## 2020-10-22 DIAGNOSIS — Z00.00 ROUTINE GENERAL MEDICAL EXAMINATION AT A HEALTH CARE FACILITY: ICD-10-CM

## 2020-10-22 PROCEDURE — 84153 ASSAY OF PSA TOTAL: CPT | Performed by: INTERNAL MEDICINE

## 2020-10-22 PROCEDURE — 84443 ASSAY THYROID STIM HORMONE: CPT | Performed by: INTERNAL MEDICINE

## 2020-10-22 PROCEDURE — 81003 URINALYSIS AUTO W/O SCOPE: CPT | Performed by: INTERNAL MEDICINE

## 2020-10-22 PROCEDURE — 85025 COMPLETE CBC W/AUTO DIFF WBC: CPT | Performed by: INTERNAL MEDICINE

## 2020-10-22 PROCEDURE — 36415 COLL VENOUS BLD VENIPUNCTURE: CPT | Performed by: INTERNAL MEDICINE

## 2020-10-22 PROCEDURE — 83036 HEMOGLOBIN GLYCOSYLATED A1C: CPT | Performed by: INTERNAL MEDICINE

## 2020-10-27 ENCOUNTER — HOSPITAL ENCOUNTER (OUTPATIENT)
Dept: ULTRASOUND IMAGING | Age: 55
Discharge: HOME | End: 2020-10-27
Attending: OTOLARYNGOLOGY
Payer: COMMERCIAL

## 2020-10-27 DIAGNOSIS — E04.1 RIGHT THYROID NODULE: ICD-10-CM

## 2020-10-27 PROCEDURE — 76536 US EXAM OF HEAD AND NECK: CPT | Performed by: OTOLARYNGOLOGY

## 2020-11-12 ENCOUNTER — TELEPHONE (OUTPATIENT)
Dept: INTERNAL MEDICINE CLINIC | Facility: CLINIC | Age: 55
End: 2020-11-12

## 2020-11-12 NOTE — TELEPHONE ENCOUNTER
Patient states he was told by Dr. Rockne Najjar if thyroid US stable he may not need to continue yearly US follow up. Result from 10/27/20:  =====  CONCLUSION:  Stable multinodular thyroid. Is patient okay to decrease frequency of thyroid US's?

## 2020-11-13 NOTE — TELEPHONE ENCOUNTER
I spoke with patient. This is now 4 years in a row of stable multinodular thyroid on ultrasound (2017, 2018, 2019, and 2020).  At this point, my recommendation would be to stop doing them, however, I'd like to get Dr. Marvetta Bumpers opinion of whether we sofi

## 2020-11-16 ENCOUNTER — TELEPHONE (OUTPATIENT)
Dept: INTERNAL MEDICINE CLINIC | Facility: CLINIC | Age: 55
End: 2020-11-16

## 2020-11-16 NOTE — TELEPHONE ENCOUNTER
I spoke with patient. This is now 4 years in a row of stable multinodular thyroid on ultrasound (2017, 2018, 2019, and 2020).  At this point, my recommendation would be to stop doing them, however, I'd like to get Dr. Ragland Porter opinion of whether we sofi

## 2020-11-17 NOTE — TELEPHONE ENCOUNTER
PC and spoke with pt  Per / Ying walker for last four years.      Pt still sees his ENT, Dr. Rebecca Cm and will continue to f/u with him on a regular basis

## 2020-11-23 ENCOUNTER — TELEPHONE (OUTPATIENT)
Dept: INTERNAL MEDICINE CLINIC | Facility: CLINIC | Age: 55
End: 2020-11-23

## 2020-11-23 RX ORDER — ESCITALOPRAM OXALATE 10 MG/1
10 TABLET ORAL DAILY
Qty: 90 TABLET | Refills: 3 | Status: SHIPPED | OUTPATIENT
Start: 2020-11-23 | End: 2021-05-26

## 2021-01-07 ENCOUNTER — TELEPHONE (OUTPATIENT)
Dept: INTERNAL MEDICINE CLINIC | Facility: CLINIC | Age: 56
End: 2021-01-07

## 2021-01-07 DIAGNOSIS — M25.511 ACUTE PAIN OF RIGHT SHOULDER: Primary | ICD-10-CM

## 2021-01-07 NOTE — TELEPHONE ENCOUNTER
Patient Dot Mitchell has pain in his rt shoulder and wants to know if he needs to see Dr. Aziza Castillo or needs a referral to a orthopedic?  Please call

## 2021-01-07 NOTE — TELEPHONE ENCOUNTER
Pt called w c/o shoulder pain.    -Onset? 2 weeks ago  -Location? R shoulder  -Is your pain manageable / moderate / or severe? Mild. However, progressively increasing day by day. -Is your pain continuous or intermittent? Intermittent, only w movement   -Type of pain ? Sharp   -Does it radiate? No   -Any trigger that may be the nature of your symptoms? Per pt has hx of bursitis, and saw ortho years ago, and received steroid shots.   -When does it get worse? After lying down. Discomfort while sleeping.   -Any old or recent injury? No  -Otc meds? Has tried taking Aleve, TID every 8 hrs. It helps \"minimal\"  Able to raise arm up to head    Offered pt an apt to see Dr Araceli Coronado for an assessment. Pt would like Dr Araceli Coronado to review info and decide if needs to see specialist right away. Please advise.

## 2021-01-07 NOTE — TELEPHONE ENCOUNTER
He can be seen by Dr. Elías Murphy from [facility]. Kamlagretchen James MD  Diplomate, American Board of Internal Medicine  Member, [address] [place] [address] ([email or web address])  Affiliate Physician, [facility] ([email or web address])

## 2021-01-13 ENCOUNTER — OFFICE VISIT (OUTPATIENT)
Dept: ORTHOPEDICS CLINIC | Facility: CLINIC | Age: 56
End: 2021-01-13
Payer: COMMERCIAL

## 2021-01-13 ENCOUNTER — HOSPITAL ENCOUNTER (OUTPATIENT)
Dept: GENERAL RADIOLOGY | Age: 56
Discharge: HOME | End: 2021-01-13
Attending: ORTHOPAEDIC SURGERY
Payer: COMMERCIAL

## 2021-01-13 DIAGNOSIS — M25.511 RIGHT SHOULDER PAIN, UNSPECIFIED CHRONICITY: ICD-10-CM

## 2021-01-13 DIAGNOSIS — M75.41 IMPINGEMENT SYNDROME OF RIGHT SHOULDER: ICD-10-CM

## 2021-01-13 DIAGNOSIS — M25.511 RIGHT SHOULDER PAIN, UNSPECIFIED CHRONICITY: Primary | ICD-10-CM

## 2021-01-13 PROCEDURE — 20610 DRAIN/INJ JOINT/BURSA W/O US: CPT | Performed by: ORTHOPAEDIC SURGERY

## 2021-01-13 PROCEDURE — 99243 OFF/OP CNSLTJ NEW/EST LOW 30: CPT | Performed by: ORTHOPAEDIC SURGERY

## 2021-01-13 PROCEDURE — 73030 X-RAY EXAM OF SHOULDER: CPT | Performed by: ORTHOPAEDIC SURGERY

## 2021-01-13 RX ORDER — TRIAMCINOLONE ACETONIDE 40 MG/ML
40 INJECTION, SUSPENSION INTRA-ARTICULAR; INTRAMUSCULAR ONCE
Status: COMPLETED | OUTPATIENT
Start: 2021-01-13 | End: 2021-01-13

## 2021-01-13 RX ORDER — COVID-19 ANTIGEN TEST
220 KIT MISCELLANEOUS
COMMUNITY
End: 2021-02-23

## 2021-01-13 RX ADMIN — TRIAMCINOLONE ACETONIDE 40 MG: 40 INJECTION, SUSPENSION INTRA-ARTICULAR; INTRAMUSCULAR at 17:20:00

## 2021-01-14 NOTE — PROGRESS NOTES
[facility] New Patient Note    CC: Patient presents with:  Shoulder Pain: Patient is here today due to having right shoulder pain for about a month. Patient is a . He had no specific trauma to the shoulder.       HPI: The patie Hypertension Father    • High Blood Pressure Brother    • Colon Cancer Brother 61     Social History    Occupational History      Not on file    Tobacco Use      Smoking status: Never Smoker      Smokeless tobacco: Never Used    Substance and Sexual Activi findings with the patient. Although there are some early arthritic changes on the plain films his symptoms appear to be emanating from the subdeltoid bursa and rotator cuff. This is likely related to his repetitive overhead positioning and lifting.   Kati

## 2021-02-08 ENCOUNTER — TELEPHONE (OUTPATIENT)
Dept: INTERNAL MEDICINE CLINIC | Facility: CLINIC | Age: 56
End: 2021-02-08

## 2021-02-15 PROCEDURE — 88305 TISSUE EXAM BY PATHOLOGIST: CPT | Performed by: INTERNAL MEDICINE

## 2021-03-24 RX ORDER — ERYTHROMYCIN 20 MG/ML
SWAB TOPICAL
Refills: 0 | OUTPATIENT
Start: 2021-03-24

## 2021-03-24 NOTE — TELEPHONE ENCOUNTER
No protocol     Last refill:  3/23/2021 by Dr Anita Foreman    Erythromycin 2 % External Pads 60 each 0 3/23/2021    Sig:   One pad to AA on face QD

## 2021-04-21 ENCOUNTER — OFFICE VISIT (OUTPATIENT)
Dept: ORTHOPEDICS CLINIC | Facility: CLINIC | Age: 56
End: 2021-04-21
Payer: COMMERCIAL

## 2021-04-21 DIAGNOSIS — M75.41 IMPINGEMENT SYNDROME OF RIGHT SHOULDER: Primary | ICD-10-CM

## 2021-04-21 DIAGNOSIS — M19.011 PRIMARY OSTEOARTHRITIS OF RIGHT SHOULDER: ICD-10-CM

## 2021-04-21 PROCEDURE — 99213 OFFICE O/P EST LOW 20 MIN: CPT | Performed by: ORTHOPAEDIC SURGERY

## 2021-04-21 RX ORDER — FLUTICASONE PROPIONATE 50 MCG
SPRAY, SUSPENSION (ML) NASAL
COMMUNITY
Start: 2021-04-18

## 2021-04-21 NOTE — PROGRESS NOTES
[facility] Follow-up Progress Note      Chief Complaint: Recurrent right shoulder pain      History: The patient is a 15-year-old male who presents due to persistent and recurrent pain in his right shoulder.   He was given a cortisone injection once they become available. For now I asked him to stay away from excessive overhead lifting and reaching. All questions were answered and he verbalized understanding agreement with this treatment plan.       Raghav Humphrey MD  [address] [place] [address]

## 2021-04-23 ENCOUNTER — HOSPITAL ENCOUNTER (OUTPATIENT)
Dept: MRI IMAGING | Age: 56
Discharge: HOME | End: 2021-04-23
Attending: PHYSICIAN ASSISTANT
Payer: COMMERCIAL

## 2021-04-23 DIAGNOSIS — M75.41 IMPINGEMENT SYNDROME OF RIGHT SHOULDER: ICD-10-CM

## 2021-04-23 DIAGNOSIS — M19.011 PRIMARY OSTEOARTHRITIS OF RIGHT SHOULDER: ICD-10-CM

## 2021-04-23 PROCEDURE — 73221 MRI JOINT UPR EXTREM W/O DYE: CPT | Performed by: PHYSICIAN ASSISTANT

## 2021-05-25 RX ORDER — ESCITALOPRAM OXALATE 10 MG/1
10 TABLET ORAL DAILY
Qty: 90 TABLET | Refills: 3 | Status: CANCELLED | OUTPATIENT
Start: 2021-05-25

## 2021-05-26 ENCOUNTER — OFFICE VISIT (OUTPATIENT)
Dept: ORTHOPEDICS CLINIC | Facility: CLINIC | Age: 56
End: 2021-05-26
Payer: COMMERCIAL

## 2021-05-26 DIAGNOSIS — M75.51 ACUTE SHOULDER BURSITIS, RIGHT: ICD-10-CM

## 2021-05-26 DIAGNOSIS — M75.41 IMPINGEMENT SYNDROME OF RIGHT SHOULDER: Primary | ICD-10-CM

## 2021-05-26 DIAGNOSIS — M19.011 PRIMARY OSTEOARTHRITIS OF RIGHT SHOULDER: ICD-10-CM

## 2021-05-26 PROCEDURE — 99213 OFFICE O/P EST LOW 20 MIN: CPT | Performed by: ORTHOPAEDIC SURGERY

## 2021-05-27 NOTE — PROGRESS NOTES
[facility] Follow-up Progress Note      Chief Complaint: Right shoulder pain and weakness      History: Mr. Tadeo Wilson is a 20-year-old male returning for assessment of his right shoulder.   He underwent an MRI due to recurrent pain and weakness fo reassessment. The importance of activity modification, physical therapy and limited anti-inflammatory use was reviewed in detail. Therapy prescription is provided with instructions for a recheck in 6 weeks if he is not improved.   He relates a similar epi

## 2021-11-17 RX ORDER — ESCITALOPRAM OXALATE 10 MG/1
TABLET ORAL
Qty: 90 TABLET | Refills: 1 | OUTPATIENT
Start: 2021-11-17

## 2021-12-02 ENCOUNTER — OFFICE VISIT (OUTPATIENT)
Dept: GASTROENTEROLOGY | Facility: CLINIC | Age: 56
End: 2021-12-02
Payer: COMMERCIAL

## 2021-12-02 ENCOUNTER — TELEPHONE (OUTPATIENT)
Dept: GASTROENTEROLOGY | Facility: CLINIC | Age: 56
End: 2021-12-02

## 2021-12-02 VITALS
BODY MASS INDEX: 39.25 KG/M2 | HEIGHT: 73 IN | WEIGHT: 296.19 LBS | SYSTOLIC BLOOD PRESSURE: 128 MMHG | HEART RATE: 63 BPM | DIASTOLIC BLOOD PRESSURE: 80 MMHG

## 2021-12-02 DIAGNOSIS — K44.9 HIATAL HERNIA: ICD-10-CM

## 2021-12-02 DIAGNOSIS — K21.9 GASTROESOPHAGEAL REFLUX DISEASE, UNSPECIFIED WHETHER ESOPHAGITIS PRESENT: ICD-10-CM

## 2021-12-02 DIAGNOSIS — R13.10 DYSPHAGIA, UNSPECIFIED TYPE: Primary | ICD-10-CM

## 2021-12-02 DIAGNOSIS — R13.19 ESOPHAGEAL DYSPHAGIA: Primary | ICD-10-CM

## 2021-12-02 DIAGNOSIS — K64.9 HEMORRHOIDS, UNSPECIFIED HEMORRHOID TYPE: ICD-10-CM

## 2021-12-02 PROCEDURE — 3074F SYST BP LT 130 MM HG: CPT | Performed by: INTERNAL MEDICINE

## 2021-12-02 PROCEDURE — 3079F DIAST BP 80-89 MM HG: CPT | Performed by: INTERNAL MEDICINE

## 2021-12-02 PROCEDURE — 3008F BODY MASS INDEX DOCD: CPT | Performed by: INTERNAL MEDICINE

## 2021-12-02 PROCEDURE — 99203 OFFICE O/P NEW LOW 30 MIN: CPT | Performed by: INTERNAL MEDICINE

## 2021-12-02 NOTE — PATIENT INSTRUCTIONS
Swallowing issue  - EGD with dilation + MAC  - alternate solids and liquids     Rectal issues  - agree with metamucil

## 2021-12-02 NOTE — TELEPHONE ENCOUNTER
Pt's wife states that the procedure that is scheduled for 1-21, she is not going to be in town and she needs to drive him.  Please call

## 2021-12-03 NOTE — TELEPHONE ENCOUNTER
Scheduled for:  EGD with dilation 4325  Provider Name:  Dr Javi Blevins  Date:  12/13/2021  Location:  [facility]  Sedation:  MAC  Time:  8401 (pt is aware to arrive at 0930)    Prep:  Colyte  Meds/Allergies Reconciled?: Physician reviewed  Diagnosis with codes:  Dysph

## 2021-12-03 NOTE — TELEPHONE ENCOUNTER
Dr Mirna Giraldo- I rescheduled patient for 12/13/21, please clarify Diagnosis so I can enter case. Thank you !

## 2021-12-10 ENCOUNTER — LAB ENCOUNTER (OUTPATIENT)
Dept: LAB | Facility: HOSPITAL | Age: 56
End: 2021-12-10
Attending: INTERNAL MEDICINE
Payer: COMMERCIAL

## 2021-12-10 DIAGNOSIS — Z01.818 PRE-OP TESTING: ICD-10-CM

## 2021-12-13 ENCOUNTER — TELEPHONE (OUTPATIENT)
Dept: GASTROENTEROLOGY | Facility: CLINIC | Age: 56
End: 2021-12-13

## 2021-12-13 ENCOUNTER — ANESTHESIA EVENT (OUTPATIENT)
Dept: ENDOSCOPY | Age: 56
End: 2021-12-13
Payer: COMMERCIAL

## 2021-12-13 ENCOUNTER — ANESTHESIA (OUTPATIENT)
Dept: ENDOSCOPY | Age: 56
End: 2021-12-13
Payer: COMMERCIAL

## 2021-12-13 ENCOUNTER — HOSPITAL ENCOUNTER (OUTPATIENT)
Age: 56
Setting detail: HOSPITAL OUTPATIENT SURGERY
Discharge: HOME | End: 2021-12-13
Attending: INTERNAL MEDICINE | Admitting: INTERNAL MEDICINE
Payer: COMMERCIAL

## 2021-12-13 VITALS
BODY MASS INDEX: 39.28 KG/M2 | DIASTOLIC BLOOD PRESSURE: 95 MMHG | RESPIRATION RATE: 15 BRPM | OXYGEN SATURATION: 95 % | SYSTOLIC BLOOD PRESSURE: 129 MMHG | HEART RATE: 78 BPM | HEIGHT: 72 IN | WEIGHT: 290 LBS

## 2021-12-13 DIAGNOSIS — Z01.818 PRE-OP TESTING: Primary | ICD-10-CM

## 2021-12-13 DIAGNOSIS — R13.10 DYSPHAGIA, UNSPECIFIED TYPE: ICD-10-CM

## 2021-12-13 DIAGNOSIS — K44.9 HIATAL HERNIA: ICD-10-CM

## 2021-12-13 DIAGNOSIS — K21.9 GASTROESOPHAGEAL REFLUX DISEASE, UNSPECIFIED WHETHER ESOPHAGITIS PRESENT: ICD-10-CM

## 2021-12-13 PROCEDURE — 88312 SPECIAL STAINS GROUP 1: CPT | Performed by: INTERNAL MEDICINE

## 2021-12-13 PROCEDURE — 43239 EGD BIOPSY SINGLE/MULTIPLE: CPT | Performed by: INTERNAL MEDICINE

## 2021-12-13 PROCEDURE — 88305 TISSUE EXAM BY PATHOLOGIST: CPT | Performed by: INTERNAL MEDICINE

## 2021-12-13 PROCEDURE — 99070 SPECIAL SUPPLIES PHYS/QHP: CPT | Performed by: INTERNAL MEDICINE

## 2021-12-13 RX ORDER — LIDOCAINE HYDROCHLORIDE 10 MG/ML
INJECTION, SOLUTION EPIDURAL; INFILTRATION; INTRACAUDAL; PERINEURAL AS NEEDED
Status: DISCONTINUED | OUTPATIENT
Start: 2021-12-13 | End: 2021-12-13 | Stop reason: SURG

## 2021-12-13 RX ORDER — OMEPRAZOLE 40 MG/1
40 CAPSULE, DELAYED RELEASE ORAL DAILY
Qty: 30 CAPSULE | Refills: 3 | Status: SHIPPED | OUTPATIENT
Start: 2021-12-13

## 2021-12-13 RX ORDER — DIPHENHYDRAMINE HYDROCHLORIDE 50 MG/ML
INJECTION INTRAMUSCULAR; INTRAVENOUS AS NEEDED
Status: DISCONTINUED | OUTPATIENT
Start: 2021-12-13 | End: 2021-12-13 | Stop reason: SURG

## 2021-12-13 RX ORDER — SODIUM CHLORIDE, SODIUM LACTATE, POTASSIUM CHLORIDE, CALCIUM CHLORIDE 600; 310; 30; 20 MG/100ML; MG/100ML; MG/100ML; MG/100ML
INJECTION, SOLUTION INTRAVENOUS CONTINUOUS
Status: DISCONTINUED | OUTPATIENT
Start: 2021-12-13 | End: 2021-12-13

## 2021-12-13 RX ADMIN — DIPHENHYDRAMINE HYDROCHLORIDE 50 MG: 50 INJECTION INTRAMUSCULAR; INTRAVENOUS at 10:56:00

## 2021-12-13 RX ADMIN — LIDOCAINE HYDROCHLORIDE 50 MG: 10 INJECTION, SOLUTION EPIDURAL; INFILTRATION; INTRACAUDAL; PERINEURAL at 10:57:00

## 2021-12-13 RX ADMIN — SODIUM CHLORIDE, SODIUM LACTATE, POTASSIUM CHLORIDE, CALCIUM CHLORIDE: 600; 310; 30; 20 INJECTION, SOLUTION INTRAVENOUS at 10:53:00

## 2021-12-13 NOTE — ANESTHESIA POSTPROCEDURE EVALUATION
Patient: Angeles Elder    Procedure Summary     Date: 12/13/21 Room / Location: [facility] ENDOSCOPY 01 / [facility] ENDO    Anesthesia Start: 9483 Anesthesia Stop: 5796    Procedure: ESOPHAGOGASTRODUODENOSCOPY (EGD) with dilation (N/A ) Diagnosis:       Dysphagia

## 2021-12-13 NOTE — ANESTHESIA PREPROCEDURE EVALUATION
Anesthesia PreOp Note    HPI:     Sun Guillaume is a 55year old male who presents for preoperative consultation requested by: Александр Owusu MD    Date of Surgery: 12/13/2021    Procedure(s):  ESOPHAGOGASTRODUODENOSCOPY (EGD) with dilation  Indicatio cecal, 3 hepatic flexure, 2 descending polyps-adenomas, diverticulosis, Procedure: COLONOSCOPY, POSSIBLE BIOPSY, POSSIBLE POLYPECTOMY 21895;  Surgeon: Hyacinth Olivia MD;  Location: [address] [place] [address]  02/26/2010    Dr. Carcamo Eye  Service: No        Blood Transfusions: No        Occupational Exposure: Not Asked        Hobby Hazards: Not Asked        Sleep Concern: Not Asked        Back Care: Not Asked        Bike Helmet: Not Asked        Self-Exams: Not Asked    Social Hist

## 2021-12-13 NOTE — H&P
History & Physical Examination    Patient Name: Starla Tenorio  MRN: [id number]  CSN: [id number]  YOB: 1965    Diagnosis:     dysphagia      Fluticasone Propionate 50 MCG/ACT Nasal Suspension, , Disp: , Rfl: ,  at prn  ASPIRIN 81 MG Oral Chew Alcohol use:  Yes      Alcohol/week: 0.0 standard drinks      Comment: Only out      SYSTEM Check if Review is Normal Check if Physical Exam is Normal If not normal, please explain:   HEENT [x ] [ x]    NECK & BACK [x ] [x ]    HEART [x ] [ x]    LUNGS [x ]

## 2021-12-13 NOTE — OPERATIVE REPORT
[facility] Endoscopy Report      Preoperative Diagnosis:  - dysphagia       Postoperative Diagnosis:  - hiatal hernia 2 cm  - Waggoner's mucosa  - gastritis       Procedure:    Esophagogastroduodenoscopy     Surgeon:  Medardo Villagran M.D.

## 2022-02-03 ENCOUNTER — TELEPHONE (OUTPATIENT)
Dept: GASTROENTEROLOGY | Facility: CLINIC | Age: 57
End: 2022-02-03

## 2022-02-03 NOTE — TELEPHONE ENCOUNTER
----- Message from Christopher Osorio MD sent at 2/2/2022 11:03 AM CST -----  EGD showed a hiatal hernia but biopsies of the esophagus were negative for Waggoner's esophagus. Samples taken from the stomach ( gastritis ) were negative for h pylori.     Repeat EGD in 3 years

## 2022-02-03 NOTE — TELEPHONE ENCOUNTER
Snapshot updated. 3 year EGD recall entered into patient outreach in [address] [place] [address] Rd. Next EGD will be due 12/13/2024.     Patient viewed below result note in MyChart:  Seen by patient Jean Irene on 2/2/2022 11:28 AM

## 2022-02-15 NOTE — TELEPHONE ENCOUNTER
Need to get labs, ekg from [facility] [phone] f([phone]) for preop clearance, ordered by Dr Familia Luna, or JIGNESH Cabezas Patient is calling for MRI test results from 12.17.19.  Please advise

## 2022-03-15 RX ORDER — OMEPRAZOLE 40 MG/1
40 CAPSULE, DELAYED RELEASE ORAL DAILY
Qty: 30 CAPSULE | Refills: 5 | Status: SHIPPED | OUTPATIENT
Start: 2022-03-15

## 2022-04-06 PROBLEM — I65.23 BILATERAL CAROTID ARTERY STENOSIS: Status: ACTIVE | Noted: 2022-04-06

## 2022-04-06 PROBLEM — R93.1 ELEVATED CORONARY ARTERY CALCIUM SCORE: Status: ACTIVE | Noted: 2022-04-06

## 2022-04-06 PROBLEM — E78.00 PURE HYPERCHOLESTEROLEMIA: Status: ACTIVE | Noted: 2022-04-06

## 2022-07-14 ENCOUNTER — PATIENT MESSAGE (OUTPATIENT)
Dept: GASTROENTEROLOGY | Facility: CLINIC | Age: 57
End: 2022-07-14

## 2022-07-14 RX ORDER — OMEPRAZOLE 40 MG/1
40 CAPSULE, DELAYED RELEASE ORAL DAILY
Qty: 90 CAPSULE | Refills: 0 | Status: SHIPPED | OUTPATIENT
Start: 2022-07-14

## 2022-07-20 RX ORDER — OMEPRAZOLE 40 MG/1
CAPSULE, DELAYED RELEASE ORAL
Qty: 90 CAPSULE | Refills: 0 | OUTPATIENT
Start: 2022-07-20

## 2023-01-06 NOTE — PROGRESS NOTES
Melecio Felix is a 55year old male.     HPI:   Patient presents with:  Swallowing Problem: bowel issues  , last Colon was 2/2021 , and last Egd was 15 yrs ago     The patient is a 26-year-old male who has a history of anal fissure, anxiety, prior colon p LLC   • COLONOSCOPY,REMV BARBARA,SNARE N/A 07/23/2016    cecal, 3 hepatic flexure, 2 descending polyps-adenomas, diverticulosis, Procedure: COLONOSCOPY, POSSIBLE BIOPSY, POSSIBLE POLYPECTOMY 94015;  Surgeon: Ajith Braun MD;  Location: [facility] SURGICAL VEGA rashes or lesions  Neuro- appropriate response, alert, no confusion  . ASSESSMENT/PLAN:   Assessment   Dysphagia   Hiatal hernia  Rectal issues    The patient has a history of dysphagia and known hiatal hernia. Discussed proceeding with upper endoscopy. Yes

## 2023-02-17 ENCOUNTER — PATIENT MESSAGE (OUTPATIENT)
Facility: CLINIC | Age: 58
End: 2023-02-17

## 2023-02-20 NOTE — TELEPHONE ENCOUNTER
Avram Angelucci called back-Sarah from front office called and Malissa Spears spoke to her and gave ok to add to 3/8/23 at 8:30 with Dr. Shea Ferris.

## 2023-02-20 NOTE — TELEPHONE ENCOUNTER
I called the patient. He gave me his wife's number Vee Le [phone] and asked that I call her about it. I called her to offer the open 0830 appointment with Dr. Emerita Chun on Wed 3/8/2023. She did not answer. CSS:ok to offer to her if he calls back to help with this. Patient sent Links [employer] about his wife-should not be sending [employer] messages about anyone but himself in his account.

## 2023-11-05 ENCOUNTER — HOSPITAL ENCOUNTER (EMERGENCY)
Age: 58
Discharge: HOME | End: 2023-11-05
Attending: EMERGENCY MEDICINE
Payer: COMMERCIAL

## 2023-11-05 ENCOUNTER — APPOINTMENT (OUTPATIENT)
Dept: MRI IMAGING | Age: 58
End: 2023-11-05
Attending: EMERGENCY MEDICINE
Payer: COMMERCIAL

## 2023-11-05 VITALS
RESPIRATION RATE: 16 BRPM | HEART RATE: 60 BPM | WEIGHT: 290 LBS | HEIGHT: 72 IN | SYSTOLIC BLOOD PRESSURE: 143 MMHG | DIASTOLIC BLOOD PRESSURE: 89 MMHG | BODY MASS INDEX: 39.28 KG/M2 | TEMPERATURE: 98 F | OXYGEN SATURATION: 98 %

## 2023-11-05 DIAGNOSIS — I10 HYPERTENSION, UNSPECIFIED TYPE: ICD-10-CM

## 2023-11-05 DIAGNOSIS — R42 VERTIGO: Primary | ICD-10-CM

## 2023-11-05 LAB
ALBUMIN SERPL-MCNC: 3.7 G/DL (ref 3.4–5)
ALBUMIN/GLOB SERPL: 1.1 {RATIO} (ref 1–2)
ALP LIVER SERPL-CCNC: 80 U/L
ALT SERPL-CCNC: 36 U/L
ANION GAP SERPL CALC-SCNC: 6 MMOL/L (ref 0–18)
AST SERPL-CCNC: 20 U/L (ref 15–37)
BASOPHILS # BLD AUTO: 0.04 X10(3) UL (ref 0–0.2)
BASOPHILS NFR BLD AUTO: 0.7 %
BILIRUB SERPL-MCNC: 0.3 MG/DL (ref 0.1–2)
BUN BLD-MCNC: 16 MG/DL (ref 9–23)
CALCIUM BLD-MCNC: 8.4 MG/DL (ref 8.5–10.1)
CHLORIDE SERPL-SCNC: 109 MMOL/L (ref 98–112)
CO2 SERPL-SCNC: 25 MMOL/L (ref 21–32)
CREAT BLD-MCNC: 0.97 MG/DL
EGFRCR SERPLBLD CKD-EPI 2021: 91 ML/MIN/1.73M2 (ref 60–?)
EOSINOPHIL # BLD AUTO: 0.12 X10(3) UL (ref 0–0.7)
EOSINOPHIL NFR BLD AUTO: 2 %
ERYTHROCYTE [DISTWIDTH] IN BLOOD BY AUTOMATED COUNT: 12.9 %
GLOBULIN PLAS-MCNC: 3.5 G/DL (ref 2.8–4.4)
GLUCOSE BLD-MCNC: 100 MG/DL (ref 70–99)
GLUCOSE BLD-MCNC: 89 MG/DL (ref 70–99)
HCT VFR BLD AUTO: 42.3 %
HGB BLD-MCNC: 14.8 G/DL
IMM GRANULOCYTES # BLD AUTO: 0.02 X10(3) UL (ref 0–1)
IMM GRANULOCYTES NFR BLD: 0.3 %
LYMPHOCYTES # BLD AUTO: 2.01 X10(3) UL (ref 1–4)
LYMPHOCYTES NFR BLD AUTO: 33.4 %
MCH RBC QN AUTO: 29.4 PG (ref 26–34)
MCHC RBC AUTO-ENTMCNC: 35 G/DL (ref 31–37)
MCV RBC AUTO: 84.1 FL
MONOCYTES # BLD AUTO: 0.56 X10(3) UL (ref 0.1–1)
MONOCYTES NFR BLD AUTO: 9.3 %
NEUTROPHILS # BLD AUTO: 3.27 X10 (3) UL (ref 1.5–7.7)
NEUTROPHILS # BLD AUTO: 3.27 X10(3) UL (ref 1.5–7.7)
NEUTROPHILS NFR BLD AUTO: 54.3 %
OSMOLALITY SERPL CALC.SUM OF ELEC: 291 MOSM/KG (ref 275–295)
PLATELET # BLD AUTO: 241 10(3)UL (ref 150–450)
POTASSIUM SERPL-SCNC: 4.1 MMOL/L (ref 3.5–5.1)
PROT SERPL-MCNC: 7.2 G/DL (ref 6.4–8.2)
RBC # BLD AUTO: 5.03 X10(6)UL
SODIUM SERPL-SCNC: 140 MMOL/L (ref 136–145)
TROPONIN I SERPL HS-MCNC: 6 NG/L
WBC # BLD AUTO: 6 X10(3) UL (ref 4–11)

## 2023-11-05 PROCEDURE — 82962 GLUCOSE BLOOD TEST: CPT

## 2023-11-05 PROCEDURE — 85025 COMPLETE CBC W/AUTO DIFF WBC: CPT | Performed by: EMERGENCY MEDICINE

## 2023-11-05 PROCEDURE — 70553 MRI BRAIN STEM W/O & W/DYE: CPT | Performed by: EMERGENCY MEDICINE

## 2023-11-05 PROCEDURE — 99284 EMERGENCY DEPT VISIT MOD MDM: CPT

## 2023-11-05 PROCEDURE — 36415 COLL VENOUS BLD VENIPUNCTURE: CPT

## 2023-11-05 PROCEDURE — 93005 ELECTROCARDIOGRAM TRACING: CPT

## 2023-11-05 PROCEDURE — 84484 ASSAY OF TROPONIN QUANT: CPT | Performed by: EMERGENCY MEDICINE

## 2023-11-05 PROCEDURE — 93010 ELECTROCARDIOGRAM REPORT: CPT

## 2023-11-05 PROCEDURE — 99285 EMERGENCY DEPT VISIT HI MDM: CPT

## 2023-11-05 PROCEDURE — A9575 INJ GADOTERATE MEGLUMI 0.1ML: HCPCS | Performed by: EMERGENCY MEDICINE

## 2023-11-05 PROCEDURE — 80053 COMPREHEN METABOLIC PANEL: CPT | Performed by: EMERGENCY MEDICINE

## 2023-11-05 RX ORDER — GADOTERATE MEGLUMINE 376.9 MG/ML
20 INJECTION INTRAVENOUS
Status: COMPLETED | OUTPATIENT
Start: 2023-11-05 | End: 2023-11-05

## 2023-11-05 RX ORDER — MECLIZINE HYDROCHLORIDE 25 MG/1
25 TABLET ORAL 3 TIMES DAILY PRN
Qty: 20 TABLET | Refills: 0 | Status: SHIPPED | OUTPATIENT
Start: 2023-11-05 | End: 2023-11-12

## 2023-11-05 NOTE — ED INITIAL ASSESSMENT (HPI)
C/o vertigo like dizziness this am while doing exercises while laying on floor - has not feelt well since, high bp at home

## 2023-11-06 LAB
ATRIAL RATE: 57 BPM
P AXIS: 41 DEGREES
P-R INTERVAL: 156 MS
Q-T INTERVAL: 418 MS
QRS DURATION: 86 MS
QTC CALCULATION (BEZET): 406 MS
R AXIS: 16 DEGREES
T AXIS: 19 DEGREES
VENTRICULAR RATE: 57 BPM

## 2024-04-20 ENCOUNTER — APPOINTMENT (OUTPATIENT)
Dept: ULTRASOUND IMAGING | Age: 59
End: 2024-04-20
Attending: EMERGENCY MEDICINE
Payer: COMMERCIAL

## 2024-04-20 ENCOUNTER — HOSPITAL ENCOUNTER (EMERGENCY)
Age: 59
Discharge: HOME | End: 2024-04-20
Attending: EMERGENCY MEDICINE
Payer: COMMERCIAL

## 2024-04-20 ENCOUNTER — APPOINTMENT (OUTPATIENT)
Dept: GENERAL RADIOLOGY | Age: 59
End: 2024-04-20
Attending: EMERGENCY MEDICINE
Payer: COMMERCIAL

## 2024-04-20 VITALS
HEART RATE: 62 BPM | TEMPERATURE: 98 F | BODY MASS INDEX: 36.57 KG/M2 | HEIGHT: 74 IN | OXYGEN SATURATION: 99 % | WEIGHT: 285 LBS | DIASTOLIC BLOOD PRESSURE: 92 MMHG | RESPIRATION RATE: 18 BRPM | SYSTOLIC BLOOD PRESSURE: 153 MMHG

## 2024-04-20 DIAGNOSIS — S86.912A STRAIN OF LEFT KNEE, INITIAL ENCOUNTER: Primary | ICD-10-CM

## 2024-04-20 DIAGNOSIS — M71.22 SYNOVIAL CYST OF LEFT POPLITEAL SPACE: ICD-10-CM

## 2024-04-20 PROCEDURE — 99284 EMERGENCY DEPT VISIT MOD MDM: CPT

## 2024-04-20 PROCEDURE — 93971 EXTREMITY STUDY: CPT | Performed by: EMERGENCY MEDICINE

## 2024-04-20 PROCEDURE — 73560 X-RAY EXAM OF KNEE 1 OR 2: CPT | Performed by: EMERGENCY MEDICINE

## 2024-04-20 RX ORDER — HYDROCODONE BITARTRATE AND ACETAMINOPHEN 5; 325 MG/1; MG/1
1 TABLET ORAL EVERY 6 HOURS PRN
Qty: 10 TABLET | Refills: 0 | Status: SHIPPED | OUTPATIENT
Start: 2024-04-20

## 2024-04-20 RX ORDER — HYDROCODONE BITARTRATE AND ACETAMINOPHEN 5; 325 MG/1; MG/1
1 TABLET ORAL EVERY 6 HOURS PRN
Qty: 10 TABLET | Refills: 0 | Status: SHIPPED | OUTPATIENT
Start: 2024-04-20 | End: 2024-04-20

## 2024-04-20 NOTE — ED INITIAL ASSESSMENT (HPI)
Mild left leg pain for a week and a half drove to [place], yesterday buckle having worsening pain to left leg

## 2024-04-20 NOTE — DISCHARGE INSTRUCTIONS
REST AT HOME  YOU HAVE A BAKERS CYST BEHIND YOUR LEFT KNEE  YOU HAVE A HEMATOMA (BRUISE) IN YOUR LEFT CALF MUSCLE  MOTRIN FOR PAIN AT HOME  USE KNEE IMMOBILIZER AND CRUTCHES AT HOME  FOLLOW UP WITH YOUR ORTHO MD ON MONDAY PREVIOUSLY SCHEDULED  RETURN TO THE ED IF SYMPTOMS WORSEN OR IF ANY OTHER PROBLEMS ARISE

## 2024-04-20 NOTE — ED PROVIDER NOTES
Patient Seen in: [facility] [place]      History     Chief Complaint   Patient presents with    Leg or Foot Injury     Stated Complaint: left leg pain swelling    Subjective:   HPI    Patient is a 58-year-old male presents emergency room with a history of ongoing knee pain and feeling like his knee buckled today.  The patient initially had an injury where he slipped was uncertain whether he may have strained something in his knee at that time and then has had discomfort to the back of his hamstring area which has been ongoing for the last week.  The patient recently drove to and from [place] and today when he went to stand up felt like his left knee buckled he has pain to the lateral and medial aspect of his left knee at this time.  Patient states he has no discomfort at rest and only has discomfort when he puts weight on his left knee.  The patient denies history of any other somatic complaints or discomfort at this time.  The patient denies any left hip or left ankle pain.  Patient denies history of any fall or direct trauma to the knee.    Objective:   Past Medical History:    Anal fissure    Anxiety state, unspecified    Colon polyp    Diverticular disease    Other and unspecified hyperlipidemia    Pneumonia              Past Surgical History:   Procedure Laterality Date    Colonoscopy  02/13/2021    diverticulosis, 2 ascending, descending polyps    Colonoscopy N/A 2/13/2021    Procedure: COLONOSCOPY, POSSIBLE BIOPSY, POSSIBLE POLYPECTOMY 73490;  Surgeon: Salinas Bray MD;  Location: [facility]    Colonoscopy,remv lesn,snare N/A 07/23/2016    cecal, 3 hepatic flexure, 2 descending polyps-adenomas, diverticulosis, Procedure: COLONOSCOPY, POSSIBLE BIOPSY, POSSIBLE POLYPECTOMY 49769;  Surgeon: Salinas Bray MD;  Location: [facility]    Vasectomy  02/26/2010    Dr. Cook                Social History     Socioeconomic History    Marital status:      Number of children: 2   Occupational History    Occupation:    Tobacco Use    Smoking status: Never    Smokeless tobacco: Never   Vaping Use    Vaping status: Never Used   Substance and Sexual Activity    Alcohol use: Yes     Alcohol/week: 0.0 standard drinks of alcohol     Comment: Only out    Drug use: Never    Sexual activity: Yes     Partners: Female   Other Topics Concern    Caffeine Concern No    Exercise Yes     Comment: a little    Seat Belt Yes    Special Diet No    Stress Concern No    Weight Concern No     Service No    Blood Transfusions No     Social Determinants of Health      Received from [facility]    Housing Stability              Review of Systems    Positive for stated complaint: left leg pain swelling  Other systems are as noted in HPI.  Constitutional and vital signs reviewed.      All other systems reviewed and negative except as noted above.    Physical Exam     ED Triage Vitals   BP 04/20/24 1533 (!) 159/95   Pulse 04/20/24 1532 60   Resp 04/20/24 1532 18   Temp 04/20/24 1532 98.1 °F (36.7 °C)   Temp src --    SpO2 04/20/24 1532 98 %   O2 Device 04/20/24 1532 None (Room air)       Current:BP (!) 159/95   Pulse 60   Temp 98.1 °F (36.7 °C)   Resp 18   Ht 188 cm (6' 2\")   Wt 129.3 kg   SpO2 98%   BMI 36.59 kg/m²         Physical Exam  GENERAL: Well-developed, well-nourished male sitting up breathing easily in no apparent distress.  Patient is nontoxic in appearance.  EXTREMITIES: There is no cyanosis, clubbing, or edema appreciated. Pulses are 2+ and equal in both lower extremities.  There is mild tenderness to palpation along the medial and lateral aspect of the left knee only.  There is a negative anterior drawer sign and no laxity with valgus or varus stress of the left knee.   extremities.  There is no left hip tenderness tenderness to palpation appreciated there is no midshaft or distal left tib-fib tenderness to palpation  appreciated.  NEURO: Patient is awake, alert and oriented to time place and person. Motor strength is 5 over 5 in all joints of the left lower extremity.           ED Course   Labs Reviewed - No data to display  I personally reviewed the patient's left knee x-rays and my individual interpretation of the images shows no acute fracture.  I also reviewed the official radiology report which showed results as noted below.        US VENOUS DOPPLER LEG LEFT - DIAG IMG (CPT=93971)    Result Date: 4/20/2024  CONCLUSION:  There is no DVT in the left leg.  Left Baker's cyst.  Fluid collection medial left calf likely representing liquefying hematoma in the appropriate clinical setting.   LOCATION:  [facility]    Dictated by (CST): Amparo Garland MD on 4/20/2024 at 4:34 PM     Finalized by (CST): Amparo Garland MD on 4/20/2024 at 4:35 PM       XR KNEE (1 OR 2 VIEWS), LEFT (CPT=73560)    Result Date: 4/20/2024  CONCLUSION:  No acute osseous findings.  Minimal osteoarthritis.  Trace nonspecific joint fluid.   LOCATION:  [facility]   Dictated by (CST): Mau Whiting MD on 4/20/2024 at 3:51 PM     Finalized by (CST): Mau Whiting MD on 4/20/2024 at 3:52 PM               MDM      Patient with x-ray and ultrasound findings as noted above.  Differential diagnosis considered by myself includes but is not limited to acute ligamentous injury left knee, acute DVT, acute avulsion fracture.  Patient sitting back and breathing easily in no apparent distress.  Patient with evidence of a Baker's cyst also evidence of a liquefying hematoma in the left calf.  Patient was placed into a knee immobilizer he states he has crutches that he can use at home he will take ibuprofen for pain at home he was given a prescription for additional pain medication as needed instructions to ice rest and elevate the knee at home to return to the ER immediately if symptoms worsen or if any other problems arise.  He already has a scheduled appointment to see his  orthopedist on Monday morning.  The patient will follow-up with orthopedist he may need additional imaging including MRI.  The patient was discharged home at this time.                                   Medical Decision Making      Disposition and Plan     Clinical Impression:  1. Strain of left knee, initial encounter    2. Synovial cyst of left popliteal space         Disposition:  Discharge  4/20/2024  4:42 pm    Follow-up:  Kunal Mitchell MD  [address] [place] [address]  [address]  [facility] [address]  [phone]    Follow up in 2 day(s)  or follow up monday for your previously scheduled ortho appt          Medications Prescribed:  Current Discharge Medication List        START taking these medications    Details   HYDROcodone-acetaminophen 5-325 MG Oral Tab Take 1 tablet by mouth every 6 (six) hours as needed for Pain.  Qty: 10 tablet, Refills: 0    Associated Diagnoses: Strain of left knee, initial encounter

## 2024-09-19 ENCOUNTER — TELEPHONE (OUTPATIENT)
Facility: CLINIC | Age: 59
End: 2024-09-19

## 2024-09-19 NOTE — TELEPHONE ENCOUNTER
Patient outreach message received:    3 year EGD recall entered into patient outreach in [email or web address]. Next EGD will be due 12/13/2024.     Recall reminder letter mailed out to patient.

## 2024-10-14 ENCOUNTER — TELEPHONE (OUTPATIENT)
Facility: CLINIC | Age: 59
End: 2024-10-14

## 2024-10-14 DIAGNOSIS — K22.70 BARRETT'S ESOPHAGUS WITHOUT DYSPLASIA: Primary | ICD-10-CM

## 2024-10-14 NOTE — TELEPHONE ENCOUNTER
Dr. Osorio    Patient's spouse called to schedule 3 year recall EGD.  Please provide orders if ok to schedule directly.    Thank you    Last Procedure, Date, MD:  EGD Dr. Osorio 12/13/2021  Last Diagnosis:  Hiatal hernia, Waggoner's mucosa, gastritis  Recalled (mth/yrs): 3 years for EGD  Sedation Used Previously:  MAC  Anticoagulants: no  Diabetic Med's (PO/Injectables): no  Weight loss Med's: no  Iron/Herbal/Multivitamin Supplements (RX/OTC): no  Marijuana/Vaping/CBD: no  Height & Weight: 6'2\" 285 lbs  BMI: 36.58  Hx of Cardiac/CVA Issues/(MI/Stroke): no  Devices Pacemaker/Defibrillator/Stents:no  Respiratory Issues/Oxygen Use/JAREK/COPD: no  Issues w/ Anesthesia: no    Symptoms (Y/N): no  Symptoms Details: n/a    Special Comments/Notes: questions reviewed with spouse (ok per verbal release)    Please advise on orders and prep.     Thank you!

## 2024-10-14 NOTE — TELEPHONE ENCOUNTER
Patients wife called to schedule 3 yearly repeat Esophagogastroduodenoscopy per letter received in mail.  Please call.

## 2024-10-14 NOTE — TELEPHONE ENCOUNTER
Lma Osorio MD   Physician  Gastroenterology     Operative Report     Signed     Date of Service: 12/13/2021 11:06 AM  Case Time: Procedures: Surgeons:   12/13/2021 10:59 AM ESOPHAGOGASTRODUODENOSCOPY (EGD) with dilation    Lam Osorio MD               Signed         [facility] Endoscopy Report        Preoperative Diagnosis:  - dysphagia         Postoperative Diagnosis:  - hiatal hernia 2 cm  - Waggoner's mucosa  - gastritis         Procedure:    Esophagogastroduodenoscopy      Surgeon:  Lam Osorio M.D.     Anesthesia:  MAC      Technique:  After informed consent, the patient was placed in the left lateral recumbent position.  An Olympus adult HD gastroscope was inserted into the hypopharynx and advanced under direct vision into the esophagus, stomach and duodenum.  The endoscope was withdrawn to the stomach where retroflexion of the annulus, body, cardia and fundus was performed.  The instrument was straightened, insufflated air and fluid were suctioned and the endoscope was withdrawn.  The procedure was well tolerated without immediate complication.        Findings:  The esophagus showed an 3 columns of Waggoner's-like mucosa extending up approximately 2 cm each from the GE junction.  Additionally acid reflux/esophagitis changes were noted with tiny erosions.  Multiple biopsies taken.  Biopsies taken from the midesophagus, no stricture web or ring were noted.  The GE junction and diaphragmatic impression were at 41 cm and 43 cm were 2 cm sliding-type hiatal hernia..  The stomach distended appropriately with insufflated air.  The mucosa of the stomach showed patchy areas of erythema in the antrum of the stomach, biopsies taken.  The duodenal bulb and post bulbar regions were normal.     Estimated blood loss-insignificant  Specimens-gastric biopsies, biopsies of distal and midesophagus.        Impression:  - hiatal hernia 2 cm  - Waggoner's mucosa  - gastritis - likely NSAID related      Recommendations:  - Post procedure instructions given  - Dietary and behavioral changes to decrease acid reflux  - Omeprazole 40mg daily  - Follow up on biopsies              Lam Osorio MD  12/13/2021  11:06 AM              Lam Osorio MD  2/2/2022 11:03 AM CST       EGD showed a hiatal hernia but biopsies of the esophagus were negative for Waggoner's esophagus. Samples taken from the stomach ( gastritis ) were negative for h pylori.     Repeat EGD in 3 years            Specimen to Pathology Tissue: [id number]  Order: [id number]   Collected 12/13/2021 11:01 AM       Status: Final result       Visible to patient: Yes (seen)       Dx: Hiatal hernia; Dysphagia, unspecified...    2 Result Notes       1 Patient Communication       1 Follow-up Encounter        Component  Ref Range & Units      Case Report     Surgical Pathology                                Case: [id number]                                     Authorizing Provider:  Lam Osorio MD       Collected:           12/13/2021 11:01 AM             Ordering Location:     [facility]   Received:            12/13/2021 04:27 PM             Pathologist:           Faby Garcia MD                                                               Specimens:   A) - Gastric biopsy                                                                                    B) - GE junction biopsies, rule out barretts                                                          C) - Esophagus mid, biopsy                                                                    Final Diagnosis:        A. Gastric biopsy:  Fragments of gastric mucosa with foveolar hyperplasia and mild inactive chronic inflammation.  Diff Quik stain (with appropriate control reactivity) is negative for H pylori microorganisms.     B. GE junction; biopsy:  Fragments of squamous and glandular gastric type mucosa with mild inactive chronic inflammation.  No evidence of eosinophilic  esophagitis, goblet cell intestinal metaplasia, dysplasia, or malignancy is identified.  Diff Quik stain (with appropriate control reactivity) is negative for H pylori microorganisms.      C. Mid esophagus; biopsy:   Multiple fragments of squamous epithelium with mild squamous acanthosis.   No evidence of eosinophilic esophagitis, goblet cell intestinal metaplasia, dysplasia, or malignancy is identified.           Electronically signed by Faby Garcia MD on 12/14/2021 at  2:06 PM        Clinical Information      R13.10 Dysphagia, Unspecified Type.  K44.9 Hiatal Hernia.  K21.9 Gastroesophageal Reflux Disease, Unspecified Whether Esophagitis Present.        Gross Description      Specimen A is submitted in formalin labeled “Joey, gastric biopsy” and consists of two fragments of pink-tan soft tissue measuring in aggregate 0.3 x 0.2 x 0.2 cm. The specimen is submitted entirely in cassette A1.     Specimen B is submitted in formalin labeled “Joey, GE junction biopsies, rule out Waggoner's” and consists of two fragments of pink-tan soft tissue measuring in aggregate 0.3 x 0.2 x 0.1 cm. The specimen is submitted entirely in cassette B1.      Specimen C is submitted in formalin labeled “Joey, mid esophagus biopsy” and consists of two fragments of light pink soft tissue measuring in aggregate 0.3 x 0.2 x 0.1 cm. The specimen is inked with eosin and submitted entirely in cassette C1. (jq)     Faby Garcia M.D./[facility]     Interpretation     Benign     Electronically signed by Faby Garcia MD on 12/14/2021 at  2:06 PM     [facility]

## 2024-10-22 NOTE — TELEPHONE ENCOUNTER
Scheduled for:  EGD 76498  Provider Name:  Dr. Osorio  Date:  2/13/2025  Location:   [facility]  Sedation:  MAC  Time:  11:30 AM - Patient is aware [facility] will call the day before with arrival time.  Prep:  NPO after midnight  Meds/Allergies Reconciled?:  Physician reviewed   Diagnosis with codes:  Waggoner's Esophagus K22.70  Was patient informed to call insurance with codes (Y/N):  Yes, I confirmed [employer] PPO insurance with the patient.   Referral sent?:  Referral was sent at the time of electronic surgical scheduling.   [facility] or [facility] notified?:  I sent an electronic request to Endo Scheduling and received a confirmation today.   Medication Orders:  If taking Wegovy hold for one week prior to procedure.  Misc Orders:  N/A     Further instructions given by staff:  I discussed the prep instructions with the patient which he verbally understood and is aware that I will send the instructions today.

## 2025-02-05 ENCOUNTER — TELEPHONE (OUTPATIENT)
Facility: CLINIC | Age: 60
End: 2025-02-05

## 2025-02-05 NOTE — TELEPHONE ENCOUNTER
Patient requesting to speak with RN regarding last Esophagogastroduodenoscopy results.  Please call.  Thank you.

## 2025-02-12 NOTE — TELEPHONE ENCOUNTER
Left message to call back.  Our office tried to contact patient on three separate occasions with no answer or call back.  No response letter mailed to home address.  Encounter closed per protocol.

## 2025-02-13 NOTE — TELEPHONE ENCOUNTER
Dr. Osorio    I spoke to Andres  He is feeling well so didn't feel he needed EGD  He wanted to know why he needed a repeat EGD/review last results  I reviewed last result note that said the biopsies were negative for Waggoner's  The impression on the operative note mentions Waggoner's  I reviewed the orders and it looks like you wanted to screen for Waggoner's  Is that correct wasn't sure given the result note from the pathology?  Wants to know if needs the EGD since feeling well    Thank you    He gave me the ok to leave a detailed message if he does not answer.

## 2025-02-20 ENCOUNTER — TELEPHONE (OUTPATIENT)
Facility: CLINIC | Age: 60
End: 2025-02-20

## 2025-02-20 NOTE — TELEPHONE ENCOUNTER
Patient returning missed call. Was unable to transfer call. Please call.     See telephone encounter 2/5.

## (undated) DEVICE — CONMED SCOPE SAVER BITE BLOCK, 20X27 MM: Brand: SCOPE SAVER

## (undated) DEVICE — KIT ENDO ORCAPOD 160/180/190

## (undated) DEVICE — MEDI-VAC NON-CONDUCTIVE SUCTION TUBING 6MM X 1.8M (6FT.) L: Brand: CARDINAL HEALTH

## (undated) DEVICE — 35 ML SYRINGE REGULAR TIP: Brand: MONOJECT

## (undated) DEVICE — KIT CLEAN ENDOKIT 1.1OZ GOWNX2

## (undated) DEVICE — FORCEP RADIAL JAW 4

## (undated) DEVICE — LINE MNTR ADLT SET O2 INTMD

## (undated) NOTE — ED AVS SNAPSHOT
Holli Avila   MRN: [id number]    Department:  [facility] Emergency Department   Date of Visit:  2/9/2018           Disclosure     Insurance plans vary and the physician(s) referred by the ER may not be covered by your plan.  Please contact your tell this physician (or your personal doctor if your instructions are to return to your personal doctor) about any new or lasting problems. The primary care or specialist physician will see patients referred from the [facility] Emergency Department.  Baron Christopher

## (undated) NOTE — MR AVS SNAPSHOT
[place]  [address]  [address] [facility] [address] [phone]               Thank you for choosing us for your health care visit with Veronica Sanchez MD.  We are glad to serve you and happy to provide you with this summa 130/80 mmHg 64 97.8 °F (36.6 °C) (Oral) 72\" 273 lb 8 oz 37.09 kg/m2         Current Medications          This list is accurate as of: 4/24/17  3:46 PM.  Always use your most recent med list.                Erythromycin 2 % Pads   Apply 1 Application topi

## (undated) NOTE — MR AVS SNAPSHOT
[address]  [address] [facility], [address] [address] [phone]               Thank you for choosing us for your health care visit with Justo Martinez.   We are glad to serve you and happy to provide you with this Phone:  [phone]    - escitalopram 10 MG Tabs            MyChart     Sign up for [employer], your secure online medical record. [employer] will allow you to access patient instructions from your recent visit,  view other health information, and more.  To s Get your heart pumping – brisk walking, biking, swimming Even 10 minute increments are effective and add up over the week   2 ½ hours per week – spread out over time Use a nancy to keep you motivated   Don’t forget strength training with weights and resist

## (undated) NOTE — LETTER
[address], [place]  Authorization for Invasive Procedures  1.  I hereby authorize Dr. Ahsan Gil , my physician and whomever may be designated as the doctor's assistant, to perform the following operation and/or procedure:  Esophagoga that can occur: fever and allergic reactions, hemolytic reactions, transmission of disease such as hepatitis, AIDS, cytomegalovirus (CMV), and flluid overload.  In the event that I wish to have autologous transfusions of my own blood, or a directed donor tr physician.      Signature of Patient:  ________________________________________________ Date: _________Time: _________    Responsible person in case of minor or unconscious: _____________________________Relationship: ____________     Witness Signature: ____

## (undated) NOTE — LETTER
[facility]  Administration of Anesthesia  1. John Carmen, or _________________________________ acting on his behalf, (Patient) (Dependent/Representative) request to receive anesthesia for my pending procedure/operation/treatment.   A bleeding, seizure, cardiac arrest and death. 7. AWARENESS: I understand that it is possible (but unlikely) to have explicit memory of events from the operating room while under general anesthesia.   8. ELECTROCONVULSIVE THERAPY PATIENTS: This consent serve below affirms that prior to the time of the procedure, I have explained to the patient and/or his/her guardian, the risks and benefits of undergoing anesthesia, as well as any reasonable alternatives.     ___________________________________________________

## (undated) NOTE — LETTER
9/19/2024    Andres Cook        [address] [facility] [address]            Dear Andres Cook,      Our records indicate that you are due for an appointment for an EGD or Upper Endoscopy with Lam Osorio MD. Our doctors are booking out about 3-6 months in advance for procedures.     Please call our office to schedule a phone screening appointment to plan for the procedure(s).   Your medical well-being is important to us.    If your insurance requires a referral, please call your primary care office to request one.      Thank you,      The Physicians and Staff at [facility]

## (undated) NOTE — LETTER
August 31, 2020    Patient: Penny Curry   Date of Visit: 8/31/2020       To Whom It May Concern:    Vimalcas Goodens was seen and treated in our emergency department on 8/31/2020.  He should not return to work until September 5 May return sooner if bet

## (undated) NOTE — LETTER
2/12/2025          Andres Cook        [address] [facility] [address]         Dear Andres,    This letter is to inform you that our office has made several attempts to reach you by phone without success.  We were attempting to return your call.  We had a message that you wanted to speak to a nurse about your last Esophagogastroduodenoscopy results.    Please contact our office at the number listed below as soon as you receive this letter to discuss this issue and to make the necessary changes in our system to your contact information.  Thank you for your cooperation.        Sincerely,    Lam Osorio MD  [address]  [facility] [address] [phone]